# Patient Record
Sex: FEMALE | Race: WHITE | URBAN - METROPOLITAN AREA
[De-identification: names, ages, dates, MRNs, and addresses within clinical notes are randomized per-mention and may not be internally consistent; named-entity substitution may affect disease eponyms.]

---

## 2019-09-11 ENCOUNTER — OFFICE VISIT (OUTPATIENT)
Dept: INTERNAL MEDICINE CLINIC | Facility: CLINIC | Age: 39
End: 2019-09-11

## 2019-09-11 VITALS
DIASTOLIC BLOOD PRESSURE: 58 MMHG | HEART RATE: 83 BPM | RESPIRATION RATE: 16 BRPM | OXYGEN SATURATION: 95 % | SYSTOLIC BLOOD PRESSURE: 90 MMHG | HEIGHT: 63 IN | BODY MASS INDEX: 35.54 KG/M2 | WEIGHT: 200.6 LBS | TEMPERATURE: 98.2 F

## 2019-09-11 DIAGNOSIS — D64.9 LOW HEMOGLOBIN: ICD-10-CM

## 2019-09-11 DIAGNOSIS — Z76.89 ESTABLISHING CARE WITH NEW DOCTOR, ENCOUNTER FOR: ICD-10-CM

## 2019-09-11 DIAGNOSIS — Z13.220 SCREENING FOR HYPERCHOLESTEROLEMIA: ICD-10-CM

## 2019-09-11 DIAGNOSIS — R53.82 CHRONIC FATIGUE: Primary | ICD-10-CM

## 2019-09-11 DIAGNOSIS — Z13.1 SCREENING FOR DIABETES MELLITUS: ICD-10-CM

## 2019-09-11 DIAGNOSIS — F41.8 DEPRESSION WITH ANXIETY: ICD-10-CM

## 2019-09-11 DIAGNOSIS — I95.9 HYPOTENSION, UNSPECIFIED HYPOTENSION TYPE: ICD-10-CM

## 2019-09-11 DIAGNOSIS — E66.01 SEVERE OBESITY (HCC): ICD-10-CM

## 2019-09-11 DIAGNOSIS — N28.9 FUNCTION KIDNEY DECREASED: ICD-10-CM

## 2019-09-11 PROBLEM — S39.012A STRAIN OF LUMBAR REGION: Status: ACTIVE | Noted: 2017-10-23

## 2019-09-11 PROBLEM — M51.27 HERNIATED NUCLEUS PULPOSUS, L5-S1: Status: ACTIVE | Noted: 2017-10-26

## 2019-09-11 PROBLEM — M51.36 DDD (DEGENERATIVE DISC DISEASE), LUMBAR: Status: ACTIVE | Noted: 2018-07-31

## 2019-09-11 PROBLEM — M54.31 RIGHT SIDED SCIATICA: Status: ACTIVE | Noted: 2017-11-21

## 2019-09-11 PROBLEM — M54.50 SEVERE LOW BACK PAIN: Status: ACTIVE | Noted: 2018-07-31

## 2019-09-11 RX ORDER — CARISOPRODOL 350 MG/1
350 TABLET ORAL
COMMUNITY
Start: 2017-10-17 | End: 2019-09-11

## 2019-09-11 RX ORDER — ESCITALOPRAM OXALATE 10 MG/1
10 TABLET ORAL
COMMUNITY
Start: 2017-09-25 | End: 2019-09-11 | Stop reason: SDUPTHER

## 2019-09-11 RX ORDER — LISINOPRIL 20 MG/1
20 TABLET ORAL DAILY
Qty: 30 TAB | Refills: 11 | Status: CANCELLED | OUTPATIENT
Start: 2019-09-11

## 2019-09-11 RX ORDER — GLUCOSAMINE/CHONDR SU A SOD 750-600 MG
TABLET ORAL
COMMUNITY
End: 2021-08-17

## 2019-09-11 RX ORDER — DICLOFENAC SODIUM 75 MG/1
TABLET, DELAYED RELEASE ORAL
COMMUNITY
Start: 2018-07-30 | End: 2019-09-11

## 2019-09-11 RX ORDER — ATORVASTATIN CALCIUM 20 MG/1
20 TABLET, FILM COATED ORAL
COMMUNITY
Start: 2017-09-25 | End: 2019-09-11

## 2019-09-11 RX ORDER — ALPRAZOLAM 0.5 MG/1
TABLET ORAL
COMMUNITY
Start: 2017-09-12 | End: 2019-09-11

## 2019-09-11 RX ORDER — METHYLPREDNISOLONE 4 MG/1
TABLET ORAL
COMMUNITY
Start: 2018-07-31 | End: 2019-09-11

## 2019-09-11 RX ORDER — LISINOPRIL 20 MG/1
20 TABLET ORAL
COMMUNITY
Start: 2017-09-25 | End: 2019-09-11

## 2019-09-11 RX ORDER — TRAMADOL HYDROCHLORIDE 50 MG/1
50 TABLET ORAL
COMMUNITY
Start: 2017-10-17 | End: 2019-09-11

## 2019-09-11 RX ORDER — ATORVASTATIN CALCIUM 20 MG/1
20 TABLET, FILM COATED ORAL DAILY
Qty: 30 TAB | Refills: 11 | Status: CANCELLED | OUTPATIENT
Start: 2019-09-11

## 2019-09-11 RX ORDER — ESCITALOPRAM OXALATE 10 MG/1
10 TABLET ORAL DAILY
Qty: 30 TAB | Refills: 11 | Status: SHIPPED | OUTPATIENT
Start: 2019-09-11 | End: 2020-08-08

## 2019-09-11 NOTE — PROGRESS NOTES
HPI  Logan De La Vega is a 42y.o. year old female patient of No primary care provider on file. who presents with c/o est care. Pt has history of has DDD (degenerative disc disease), lumbar, Herniated nucleus pulposus, L5-S1, Right sided sciatica, Severe low back pain, Strain of lumbar region, Severe obesity ([address] Hypercholesteremia, HTN (hypertension), and Depression on their problem list..    Pt here to est care. [facility]. Has been out of her meds for 1 yr. Here to f/u on ED visit. Was seen at [facility] on 9-6 dx with dehydration. Labs showed Cr 1.75, BUN 34, CMP otherwise NML. TSH 4.8, T4 normal.  Hgb 11.2,HCT 33, . Repeat Cr 1.5, . Started with fatigue, palpitations, clamminess, sweating since last Wednesday, stopped her lisinopril at that time due to low BP, went to ED that Friday. BS was 136 at work, works at [employer]. Denies n/v/d or cold symptoms during that time. Feels better now, but not 100% yet. Checks BP at work 3 times/day since leaving ED- 80s-90s/50s-70s. No systolic numbers in the [address]. Drinks 3 liters of water/day at work and more after. Having trouble losing weight, has tried gym, diet changes, and low carb which hasn't worked. Has noticed hair falling out more x 1 month. Feels tired all the time. Lifestyle  Diet: Tries to avoid carbs and bread. Eats fruits and vegetables, salads, chicken. Denies fried or fast foods. Drinks 1 can pepsi/day. No coffee or energy drinks. Exercise: Was working out at gym 2 hrs every night. Now goes for a walk in the evenings everyday. ETOH: Denies  Nicotine: 3-4 cigarettes/day  Works at [employer]. Denies chest pain, chest pressure. Denies SOB, orthopnea, or PND. Has lower extremity swelling at end of day. Denies HA or blurred vision. Has had some dizziness over the last week when stands up. Denies N/V/D, constipation, heartburn, or abd pain.     Denies BRBPR, melena, or blood in urine. 3 most recent PHQ Screens 2019   Little interest or pleasure in doing things Not at all   Feeling down, depressed, irritable, or hopeless More than half the days   Total Score PHQ 2 2   Denies SI/HI. Feels jittery, on edge, irritable, worries often. Also feels down, depressed at times. Wants to restart her lexapro, felt better on it. Always had trouble sleeping- takes melatonin occasionally which helps. Patient Active Problem List   Diagnosis Code    DDD (degenerative disc disease), lumbar M51.36    Herniated nucleus pulposus, L5-S1 M51.27    Right sided sciatica M54.31    Severe low back pain M54.5    Strain of lumbar region S39.012A    Severe obesity ([facility] [address] E66.01    Hypercholesteremia E78.00    HTN (hypertension) I10    Depression F32.9     Past Medical History:   Diagnosis Date    Depression     HTN (hypertension)     Hypercholesteremia     Rosacea      Past Surgical History:   Procedure Laterality Date    HX  SECTION      HX GYN      tubal ligation     Social History     Socioeconomic History    Marital status:      Spouse name: Not on file    Number of children: Not on file    Years of education: Not on file    Highest education level: Not on file   Tobacco Use    Smoking status: Current Every Day Smoker     Packs/day: 0.25     Years: 10.00     Pack years: 2.50    Smokeless tobacco: Never Used    Tobacco comment: 3-5 cigarettes/day    Substance and Sexual Activity    Alcohol use: Not Currently    Drug use: Never    Sexual activity: Yes   Social History Narrative    Works at [employer].       Family History   Problem Relation Age of Onset    Hypertension Mother     Thyroid Disease Mother     Hypertension Father     Heart Disease Father     Cancer Father         bone, lung cancer, throat     Stroke Father     Heart Attack Paternal Grandfather         55     Allergies   Allergen Reactions    Cephalexin Shortness of Breath       MEDICATIONS  Current Outpatient Medications   Medication Sig    Biotin 2,500 mcg cap Take  by mouth.  escitalopram oxalate (LEXAPRO) 10 mg tablet Take 1 Tab by mouth daily. No current facility-administered medications for this visit. REVIEW OF SYSTEMS  Per HPI        Visit Vitals  BP 90/58   Pulse 83   Temp 98.2 °F (36.8 °C) (Oral)   Resp 16   Ht 5' 3\" (1.6 m)   Wt 200 lb 9.6 oz (91 kg)   LMP 08/28/2019   SpO2 95%   BMI 35.53 kg/m²         General: Well-developed, well-nourished. In no distress. A&O x 3. Head: Normocephalic, atraumatic. Eyes: Conjunctiva clear. Pupils equal, round, reactive to light. Extraocular movements intact. Ears/Nose: TM's and ear canals normal bilaterally. Nares normal. Septum midline. Normal nasal mucosa. No drainage or sinus tenderness. Mouth/Throat: Lips, mucosa, and tongue normal. Oropharynx benign. Neck: Supple, symmetrical, trachea midline, no lymphadenopathy, no carotid bruits, no JVD, thyroid: not enlarged, symmetric, no tenderness/mass/nodules. Lungs: Clear to auscultation bilaterally. No crackles or wheezes. No use of accessory muscles. Speaks in full sentences without SOB. Chest Wall: No tenderness or deformity. Heart: RRR, normal S1 and S2, no murmur, click, rub, or gallop. Back: Symmetric. Skin: No rashes or lesions. Neurological: CN II-XII intact. Neurovasc: No edema appreciated. Dorsalis pedis pulses are 2+ on the right side, and 2+ on the left side. Posterior tibial pulses are 2+ on the right side, and 2+ on the left side. Musculoskeletal: Gait normal.   Psychiatric: Normal mood and affect. Behavior is normal.       No results found for any visits on 09/11/19. ASSESSMENT and PLAN  Diagnoses and all orders for this visit:    1. Chronic fatigue  -     TSH RFX ON ABNORMAL TO FREE T4  R/t thryoid, anxiety/depression, improper sleep, anemia, etc?    2.  Hypotension, unspecified hypotension type  Unclear etiology  Continue to hold lisinopril  Continue to push fluids, will repeat labs today    3. Function kidney decreased  -     METABOLIC PANEL, BASIC-     4. Depression with anxiety  -     escitalopram oxalate (LEXAPRO) 10 mg tablet; Take 1 Tab by mouth daily. -RESTART medication    5. Severe obesity (HCC)  -     LIPID PANEL  -     AMB POC HEMOGLOBIN A1C    6. Low hemoglobin  -     CBC WITH AUTOMATED DIFF  -     IRON PROFILE    7. Establishing care with new doctor, encounter for    8. Screening for diabetes mellitus  -     AMB POC HEMOGLOBIN A1C    9. Screening for hypercholesterolemia  -     LIPID PANEL            Patient Instructions          Anxiety Disorder: Care Instructions  Your Care Instructions    Anxiety is a normal reaction to stress. Difficult situations can cause you to have symptoms such as sweaty palms and a nervous feeling. In an anxiety disorder, the symptoms are far more severe. Constant worry, muscle tension, trouble sleeping, nausea and diarrhea, and other symptoms can make normal daily activities difficult or impossible. These symptoms may occur for no reason, and they can affect your work, school, or social life. Medicines, counseling, and self-care can all help. Follow-up care is a key part of your treatment and safety. Be sure to make and go to all appointments, and call your doctor if you are having problems. It's also a good idea to know your test results and keep a list of the medicines you take. How can you care for yourself at home? · Take medicines exactly as directed. Call your doctor if you think you are having a problem with your medicine. · Go to your counseling sessions and follow-up appointments. · Recognize and accept your anxiety. Then, when you are in a situation that makes you anxious, say to yourself, \"This is not an emergency. I feel uncomfortable, but I am not in danger. I can keep going even if I feel anxious. \"  · Be kind to your body:  ? Relieve tension with exercise or a massage. ? Get enough rest.  ? Avoid alcohol, caffeine, nicotine, and illegal drugs. They can increase your anxiety level and cause sleep problems. ? Learn and do relaxation techniques. See below for more about these techniques. · Engage your mind. Get out and do something you enjoy. Go to a funny movie, or take a walk or hike. Plan your day. Having too much or too little to do can make you anxious. · Keep a record of your symptoms. Discuss your fears with a good friend or family member, or join a support group for people with similar problems. Talking to others sometimes relieves stress. · Get involved in social groups, or volunteer to help others. Being alone sometimes makes things seem worse than they are. · Get at least 30 minutes of exercise on most days of the week to relieve stress. Walking is a good choice. You also may want to do other activities, such as running, swimming, cycling, or playing tennis or team sports. Relaxation techniques  Do relaxation exercises 10 to 20 minutes a day. You can play soothing, relaxing music while you do them, if you wish. · Tell others in your house that you are going to do your relaxation exercises. Ask them not to disturb you. · Find a comfortable place, away from all distractions and noise. · Lie down on your back, or sit with your back straight. · Focus on your breathing. Make it slow and steady. · Breathe in through your nose. Breathe out through either your nose or mouth. · Breathe deeply, filling up the area between your navel and your rib cage. Breathe so that your belly goes up and down. · Do not hold your breath. · Breathe like this for 5 to 10 minutes. Notice the feeling of calmness throughout your whole body. As you continue to breathe slowly and deeply, relax by doing the following for another 5 to 10 minutes:  · Tighten and relax each muscle group in your body. You can begin at your toes and work your way up to your head.   · Imagine your muscle groups relaxing and becoming heavy. · Empty your mind of all thoughts. · Let yourself relax more and more deeply. · Become aware of the state of calmness that surrounds you. · When your relaxation time is over, you can bring yourself back to alertness by moving your fingers and toes and then your hands and feet and then stretching and moving your entire body. Sometimes people fall asleep during relaxation, but they usually wake up shortly afterward. · Always give yourself time to return to full alertness before you drive a car or do anything that might cause an accident if you are not fully alert. Never play a relaxation tape while you drive a car. When should you call for help? Call 911 anytime you think you may need emergency care. For example, call if:    · You feel you cannot stop from hurting yourself or someone else.   Darroll Zarpo the numbers for these national suicide hotlines: 9-453-941-TALK (6-283.284.9599) and 9-911-FCYJFHM (7-291.444.5643). If you or someone you know talks about suicide or feeling hopeless, get help right away.   Watch closely for changes in your health, and be sure to contact your doctor if:    · You have anxiety or fear that affects your life.     · You have symptoms of anxiety that are new or different from those you had before. Where can you learn more? Go to http://osito-bird.info/. Enter P754 in the search box to learn more about \"Anxiety Disorder: Care Instructions. \"  Current as of: September 11, 2018  Content Version: 12.1  © 5907-2392 Healthwise, Incorporated. Care instructions adapted under license by [employer] (which disclaims liability or warranty for this information). If you have questions about a medical condition or this instruction, always ask your healthcare professional. [address] any warranty or liability for your use of this information.          Fatigue: Care Instructions  Your Care Instructions    Fatigue is a feeling of tiredness, exhaustion, or lack of energy. You may feel fatigue because of too much or not enough activity. It can also come from stress, lack of sleep, boredom, and poor diet. Many medical problems, such as viral infections, can cause fatigue. Emotional problems, especially depression, are often the cause of fatigue. Fatigue is most often a symptom of another problem. Treatment for fatigue depends on the cause. For example, if you have fatigue because you have a certain health problem, treating this problem also treats your fatigue. If depression or anxiety is the cause, treatment may help. Follow-up care is a key part of your treatment and safety. Be sure to make and go to all appointments, and call your doctor if you are having problems. It's also a good idea to know your test results and keep a list of the medicines you take. How can you care for yourself at home? · Get regular exercise. But don't overdo it. Go back and forth between rest and exercise. · Get plenty of rest.  · Eat a healthy diet. Do not skip meals, especially breakfast.  · Reduce your use of caffeine, tobacco, and alcohol. Caffeine is most often found in coffee, tea, cola drinks, and chocolate. · Limit medicines that can cause fatigue. This includes tranquilizers and cold and allergy medicines. When should you call for help? Watch closely for changes in your health, and be sure to contact your doctor if:    · You have new symptoms such as fever or a rash.     · Your fatigue gets worse.     · You have been feeling down, depressed, or hopeless. Or you may have lost interest in things that you usually enjoy.     · You are not getting better as expected. Where can you learn more? Go to http://osito-bird.info/. Enter P812 in the search box to learn more about \"Fatigue: Care Instructions. \"  Current as of: September 23, 2018  Content Version: 12.1  © 0877-2231 Healthwise, Incorporated. Care instructions adapted under license by [employer] (which disclaims liability or warranty for this information). If you have questions about a medical condition or this instruction, always ask your healthcare professional. [address] any warranty or liability for your use of this information. Low Blood Pressure: Care Instructions  Your Care Instructions    Blood pressure is a measurement of the force of the blood against the walls of the blood vessels during and after each beat of the heart. Low blood pressure is also called hypotension. It means that your blood pressure is much lower than normal. Some people, especially young, slim women, may have slightly low blood pressure without symptoms. But in many people, low blood pressure can cause symptoms such as feeling dizzy or lightheaded. When your blood pressure is too low, your heart, brain, and other organs do not get enough blood. Low blood pressure can be caused by many things, including heart problems and some medicines. Diabetes that is not under control can cause your blood pressure to drop. And so can a severe allergic reaction or infection. Another cause is dehydration, which is when your body loses too much fluid. Treatment for low blood pressure depends on the cause. Follow-up care is a key part of your treatment and safety. Be sure to make and go to all appointments, and call your doctor if you are having problems. It's also a good idea to know your test results and keep a list of the medicines you take. How can you care for yourself at home? · Drink plenty of fluids, enough so that your urine is light yellow or clear like water. If you have kidney, heart, or liver disease and have to limit fluids, talk with your doctor before you increase the amount of fluids you drink. · Be safe with medicines. Call your doctor if you think you are having a problem with your medicine.  You will get more details on the specific medicines your doctor prescribes. · Stand up or get out of bed very slowly to allow your body to adjust.  · Get plenty of rest.  · Do not smoke. Smoking increases your risk of heart attack. If you need help quitting, talk to your doctor about stop-smoking programs and medicines. These can increase your chances of quitting for good. · Limit alcohol to 2 drinks a day for men and 1 drink a day for women. Alcohol may interfere with your medicine. In addition, alcohol can make your low blood pressure worse by causing your body to lose water. When should you call for help? Call 911 anytime you think you may need emergency care. For example, call if:    · You passed out (lost consciousness).    Call your doctor now or seek immediate medical care if:    · You are dizzy or lightheaded, or you feel like you may faint.    Watch closely for changes in your health, and be sure to contact your doctor if you have any problems. Where can you learn more? Go to http://osito-bird.info/. Enter C304 in the search box to learn more about \"Low Blood Pressure: Care Instructions. \"  Current as of: July 22, 2018  Content Version: 12.1  © 9512-0929 Healthwise[employer]. Care instructions adapted under license by [employer] (which disclaims liability or warranty for this information). If you have questions about a medical condition or this instruction, always ask your healthcare professional. [place] [address] any warranty or liability for your use of this information. There are no preventive care reminders to display for this patient. I have discussed the diagnosis with the patient and the intended plan as seen in the above orders. Patient is in agreement. The patient has received an after-visit summary and questions were answered concerning future plans. I have discussed medication side effects and warnings with the patient as well. Warning signs for the above conditions were discussed including when to call our office or go to the emergency room. The nurse provided the patient and/or family with advanced directive information if needed and encouraged the patient to provide a copy to the office when available.

## 2019-09-11 NOTE — PROGRESS NOTES
Ben Asher  Identified pt with two pt identifiers(name and ). Chief Complaint   Patient presents with   [address]     sent to ER from work low bp 90/55    Dehydration       Reviewed record In preparation for visit and have obtained necessary documentation. Has info on advanced directive but has not filled them out. Given info on advanced directives. 1. Have you been to the ER, urgent care clinic or hospitalized since your last visit? No     2. Have you seen or consulted any other health care providers outside of the [address] [place] [address] Avinash since your last visit? Include any pap smears or colon screening. No    Vitals reviewed with provider. Health Maintenance reviewed: There are no preventive care reminders to display for this patient. Wt Readings from Last 3 Encounters:   19 200 lb 9.6 oz (91 kg)        Temp Readings from Last 3 Encounters:   19 98.2 °F (36.8 °C) (Oral)        BP Readings from Last 3 Encounters:   19 (!) 84/57        Pulse Readings from Last 3 Encounters:   19 83        Vitals:    19 1043   BP: (!) 84/57   Pulse: 83   Resp: 16   Temp: 98.2 °F (36.8 °C)   TempSrc: Oral   SpO2: 95%   Weight: 200 lb 9.6 oz (91 kg)   Height: 5' 3\" (1.6 m)   PainSc:   0 - No pain   LMP: 2019          Learning Assessment:   :       Learning Assessment 2019   PRIMARY LEARNER Patient   BARRIERS PRIMARY LEARNER NONE   CO-LEARNER CAREGIVER No   PRIMARY LANGUAGE ENGLISH   LEARNER PREFERENCE PRIMARY LISTENING   ANSWERED BY self   RELATIONSHIP SELF        Depression Screening:   :       3 most recent PHQ Screens 2019   Little interest or pleasure in doing things Not at all   Feeling down, depressed, irritable, or hopeless More than half the days   Total Score PHQ 2 2        Fall Risk Assessment:   :     No flowsheet data found. Abuse Screening:   :     No flowsheet data found. ADL Screening:   :     No flowsheet data found.

## 2019-09-11 NOTE — PATIENT INSTRUCTIONS
Anxiety Disorder: Care Instructions  Your Care Instructions    Anxiety is a normal reaction to stress. Difficult situations can cause you to have symptoms such as sweaty palms and a nervous feeling. In an anxiety disorder, the symptoms are far more severe. Constant worry, muscle tension, trouble sleeping, nausea and diarrhea, and other symptoms can make normal daily activities difficult or impossible. These symptoms may occur for no reason, and they can affect your work, school, or social life. Medicines, counseling, and self-care can all help. Follow-up care is a key part of your treatment and safety. Be sure to make and go to all appointments, and call your doctor if you are having problems. It's also a good idea to know your test results and keep a list of the medicines you take. How can you care for yourself at home? · Take medicines exactly as directed. Call your doctor if you think you are having a problem with your medicine. · Go to your counseling sessions and follow-up appointments. · Recognize and accept your anxiety. Then, when you are in a situation that makes you anxious, say to yourself, \"This is not an emergency. I feel uncomfortable, but I am not in danger. I can keep going even if I feel anxious. \"  · Be kind to your body:  ? Relieve tension with exercise or a massage. ? Get enough rest.  ? Avoid alcohol, caffeine, nicotine, and illegal drugs. They can increase your anxiety level and cause sleep problems. ? Learn and do relaxation techniques. See below for more about these techniques. · Engage your mind. Get out and do something you enjoy. Go to a funny movie, or take a walk or hike. Plan your day. Having too much or too little to do can make you anxious. · Keep a record of your symptoms. Discuss your fears with a good friend or family member, or join a support group for people with similar problems. Talking to others sometimes relieves stress.   · Get involved in social groups, or volunteer to help others. Being alone sometimes makes things seem worse than they are. · Get at least 30 minutes of exercise on most days of the week to relieve stress. Walking is a good choice. You also may want to do other activities, such as running, swimming, cycling, or playing tennis or team sports. Relaxation techniques  Do relaxation exercises 10 to 20 minutes a day. You can play soothing, relaxing music while you do them, if you wish. · Tell others in your house that you are going to do your relaxation exercises. Ask them not to disturb you. · Find a comfortable place, away from all distractions and noise. · Lie down on your back, or sit with your back straight. · Focus on your breathing. Make it slow and steady. · Breathe in through your nose. Breathe out through either your nose or mouth. · Breathe deeply, filling up the area between your navel and your rib cage. Breathe so that your belly goes up and down. · Do not hold your breath. · Breathe like this for 5 to 10 minutes. Notice the feeling of calmness throughout your whole body. As you continue to breathe slowly and deeply, relax by doing the following for another 5 to 10 minutes:  · Tighten and relax each muscle group in your body. You can begin at your toes and work your way up to your head. · Imagine your muscle groups relaxing and becoming heavy. · Empty your mind of all thoughts. · Let yourself relax more and more deeply. · Become aware of the state of calmness that surrounds you. · When your relaxation time is over, you can bring yourself back to alertness by moving your fingers and toes and then your hands and feet and then stretching and moving your entire body. Sometimes people fall asleep during relaxation, but they usually wake up shortly afterward. · Always give yourself time to return to full alertness before you drive a car or do anything that might cause an accident if you are not fully alert.  Never play a relaxation tape while you drive a car. When should you call for help? Call 911 anytime you think you may need emergency care. For example, call if:    · You feel you cannot stop from hurting yourself or someone else.   Tuyet Trevino the numbers for these national suicide hotlines: 8-224-441-TALK (2-675.244.2958) and 7-070-DDFEUYA (0-881.328.1154). If you or someone you know talks about suicide or feeling hopeless, get help right away.   Watch closely for changes in your health, and be sure to contact your doctor if:    · You have anxiety or fear that affects your life.     · You have symptoms of anxiety that are new or different from those you had before. Where can you learn more? Go to http://osito-bird.info/. Enter P754 in the search box to learn more about \"Anxiety Disorder: Care Instructions. \"  Current as of: September 11, 2018  Content Version: 12.1  © 1942-4713 [employer]. Care instructions adapted under license by [employer] (which disclaims liability or warranty for this information). If you have questions about a medical condition or this instruction, always ask your healthcare professional. [place] [address] any warranty or liability for your use of this information. Fatigue: Care Instructions  Your Care Instructions    Fatigue is a feeling of tiredness, exhaustion, or lack of energy. You may feel fatigue because of too much or not enough activity. It can also come from stress, lack of sleep, boredom, and poor diet. Many medical problems, such as viral infections, can cause fatigue. Emotional problems, especially depression, are often the cause of fatigue. Fatigue is most often a symptom of another problem. Treatment for fatigue depends on the cause. For example, if you have fatigue because you have a certain health problem, treating this problem also treats your fatigue. If depression or anxiety is the cause, treatment may help.   Follow-up care is a key part of your treatment and safety. Be sure to make and go to all appointments, and call your doctor if you are having problems. It's also a good idea to know your test results and keep a list of the medicines you take. How can you care for yourself at home? · Get regular exercise. But don't overdo it. Go back and forth between rest and exercise. · Get plenty of rest.  · Eat a healthy diet. Do not skip meals, especially breakfast.  · Reduce your use of caffeine, tobacco, and alcohol. Caffeine is most often found in coffee, tea, cola drinks, and chocolate. · Limit medicines that can cause fatigue. This includes tranquilizers and cold and allergy medicines. When should you call for help? Watch closely for changes in your health, and be sure to contact your doctor if:    · You have new symptoms such as fever or a rash.     · Your fatigue gets worse.     · You have been feeling down, depressed, or hopeless. Or you may have lost interest in things that you usually enjoy.     · You are not getting better as expected. Where can you learn more? Go to http://ositoDomgeo.rubird.info/. Enter N712 in the search box to learn more about \"Fatigue: Care Instructions. \"  Current as of: September 23, 2018  Content Version: 12.1  © 2577-6421 [employer]. Care instructions adapted under license by [employer] (which disclaims liability or warranty for this information). If you have questions about a medical condition or this instruction, always ask your healthcare professional. [place] [address] any warranty or liability for your use of this information. Low Blood Pressure: Care Instructions  Your Care Instructions    Blood pressure is a measurement of the force of the blood against the walls of the blood vessels during and after each beat of the heart. Low blood pressure is also called hypotension.  It means that your blood pressure is much lower than normal. Some people, especially young, slim women, may have slightly low blood pressure without symptoms. But in many people, low blood pressure can cause symptoms such as feeling dizzy or lightheaded. When your blood pressure is too low, your heart, brain, and other organs do not get enough blood. Low blood pressure can be caused by many things, including heart problems and some medicines. Diabetes that is not under control can cause your blood pressure to drop. And so can a severe allergic reaction or infection. Another cause is dehydration, which is when your body loses too much fluid. Treatment for low blood pressure depends on the cause. Follow-up care is a key part of your treatment and safety. Be sure to make and go to all appointments, and call your doctor if you are having problems. It's also a good idea to know your test results and keep a list of the medicines you take. How can you care for yourself at home? · Drink plenty of fluids, enough so that your urine is light yellow or clear like water. If you have kidney, heart, or liver disease and have to limit fluids, talk with your doctor before you increase the amount of fluids you drink. · Be safe with medicines. Call your doctor if you think you are having a problem with your medicine. You will get more details on the specific medicines your doctor prescribes. · Stand up or get out of bed very slowly to allow your body to adjust.  · Get plenty of rest.  · Do not smoke. Smoking increases your risk of heart attack. If you need help quitting, talk to your doctor about stop-smoking programs and medicines. These can increase your chances of quitting for good. · Limit alcohol to 2 drinks a day for men and 1 drink a day for women. Alcohol may interfere with your medicine. In addition, alcohol can make your low blood pressure worse by causing your body to lose water. When should you call for help? Call 911 anytime you think you may need emergency care. For example, call if:    · You passed out (lost consciousness).    Call your doctor now or seek immediate medical care if:    · You are dizzy or lightheaded, or you feel like you may faint.    Watch closely for changes in your health, and be sure to contact your doctor if you have any problems. Where can you learn more? Go to http://osito-bird.info/. Enter C304 in the search box to learn more about \"Low Blood Pressure: Care Instructions. \"  Current as of: July 22, 2018  Content Version: 12.1  © 6661-3739 [employer]. Care instructions adapted under license by [employer] (which disclaims liability or warranty for this information). If you have questions about a medical condition or this instruction, always ask your healthcare professional. [address] any warranty or liability for your use of this information.

## 2019-09-12 LAB
BASOPHILS # BLD AUTO: 0 X10E3/UL (ref 0–0.2)
BASOPHILS NFR BLD AUTO: 0 %
BUN SERPL-MCNC: 21 MG/DL (ref 6–20)
BUN/CREAT SERPL: 23 (ref 9–23)
CALCIUM SERPL-MCNC: 9.1 MG/DL (ref 8.7–10.2)
CHLORIDE SERPL-SCNC: 98 MMOL/L (ref 96–106)
CHOLEST SERPL-MCNC: 193 MG/DL (ref 100–199)
CO2 SERPL-SCNC: 26 MMOL/L (ref 20–29)
CREAT SERPL-MCNC: 0.92 MG/DL (ref 0.57–1)
EOSINOPHIL # BLD AUTO: 0.3 X10E3/UL (ref 0–0.4)
EOSINOPHIL NFR BLD AUTO: 4 %
ERYTHROCYTE [DISTWIDTH] IN BLOOD BY AUTOMATED COUNT: 13.1 % (ref 12.3–15.4)
GLUCOSE SERPL-MCNC: 73 MG/DL (ref 65–99)
HCT VFR BLD AUTO: 36.6 % (ref 34–46.6)
HDLC SERPL-MCNC: 43 MG/DL
HGB BLD-MCNC: 12 G/DL (ref 11.1–15.9)
IMM GRANULOCYTES # BLD AUTO: 0 X10E3/UL (ref 0–0.1)
IMM GRANULOCYTES NFR BLD AUTO: 0 %
IRON SATN MFR SERPL: 30 % (ref 15–55)
IRON SERPL-MCNC: 70 UG/DL (ref 27–159)
LDLC SERPL CALC-MCNC: 109 MG/DL (ref 0–99)
LYMPHOCYTES # BLD AUTO: 3.1 X10E3/UL (ref 0.7–3.1)
LYMPHOCYTES NFR BLD AUTO: 36 %
MCH RBC QN AUTO: 30.8 PG (ref 26.6–33)
MCHC RBC AUTO-ENTMCNC: 32.8 G/DL (ref 31.5–35.7)
MCV RBC AUTO: 94 FL (ref 79–97)
MONOCYTES # BLD AUTO: 0.4 X10E3/UL (ref 0.1–0.9)
MONOCYTES NFR BLD AUTO: 5 %
NEUTROPHILS # BLD AUTO: 4.6 X10E3/UL (ref 1.4–7)
NEUTROPHILS NFR BLD AUTO: 55 %
PLATELET # BLD AUTO: 263 X10E3/UL (ref 150–450)
POTASSIUM SERPL-SCNC: 4.8 MMOL/L (ref 3.5–5.2)
RBC # BLD AUTO: 3.89 X10E6/UL (ref 3.77–5.28)
SODIUM SERPL-SCNC: 140 MMOL/L (ref 134–144)
TIBC SERPL-MCNC: 235 UG/DL (ref 250–450)
TRIGL SERPL-MCNC: 205 MG/DL (ref 0–149)
TSH SERPL DL<=0.005 MIU/L-ACNC: 2.02 UIU/ML (ref 0.45–4.5)
UIBC SERPL-MCNC: 165 UG/DL (ref 131–425)
VLDLC SERPL CALC-MCNC: 41 MG/DL (ref 5–40)
WBC # BLD AUTO: 8.5 X10E3/UL (ref 3.4–10.8)

## 2019-09-13 ENCOUNTER — TELEPHONE (OUTPATIENT)
Dept: INTERNAL MEDICINE CLINIC | Facility: CLINIC | Age: 39
End: 2019-09-13

## 2019-09-13 NOTE — PROGRESS NOTES
Verified two patient identifiers, name and . Patient provided with lab results and Np message. No questions at this time.   Patient requested statin sent to pharmacy

## 2019-09-13 NOTE — PROGRESS NOTES
Pls let pt know blood sugar, kidney function, thyroid, blood counts are all normal.    I am still waiting on the diabetes test to return. Please let patient know her LDL cholesterol and triglycerides are elevated. Elevated cholesterol increases risk for heart disease, heart attack and stroke. Ways to decrease cholesterol include decreasing saturated fats in the diet found in many packaged foods and fried foods, increasing exercise (whcih helps raise our good protective cholesterol) and weight loss if indicated. Eating a diet high in vegetables and fruits is also helpful.

## 2019-09-15 RX ORDER — ATORVASTATIN CALCIUM 20 MG/1
TABLET, FILM COATED ORAL
Qty: 30 TAB | Refills: 0 | OUTPATIENT
Start: 2019-09-15

## 2019-09-16 NOTE — TELEPHONE ENCOUNTER
Pt is calling and states her chol as under control on atorvastatin 20 mg. She was off the the stating for 6 mo and it increased. Diet and exercise does not control it. Pt would like it refilled or speak to Bhavin Parr directly.

## 2019-09-18 RX ORDER — ATORVASTATIN CALCIUM 20 MG/1
TABLET, FILM COATED ORAL
Qty: 30 TAB | Refills: 0 | Status: SHIPPED | OUTPATIENT
Start: 2019-09-18 | End: 2019-10-16 | Stop reason: SDUPTHER

## 2019-09-19 LAB
HBA1C MFR BLD: 5.5 % (ref 4.8–5.6)
SPECIMEN STATUS REPORT, ROLRST: NORMAL

## 2019-10-13 NOTE — PROGRESS NOTES
HPI  Nicolas Altamirano is a 42y.o. year old female patient of Rufino Henson NP who presents with c/o hospital f/u    Pt has history of has DDD (degenerative disc disease), lumbar, Herniated nucleus pulposus, L5-S1, Right sided sciatica, Severe low back pain, Strain of lumbar region, Severe obesity ([address] Hypercholesteremia, HTN (hypertension), and Depression on their problem list.. Here for TCM visit. Admitted from 10/5-10/8 at [facility]. Hospital records reviewed and summary below. Pt admitted for hypotension and electrolyte imbalance on 10/5-10/8 at [facility]. Cortisol and cosyntripin normal. She was started on miodrine 10mg TID and 4 days OP of [facility] and referred to endocrine for f/u. BMP with Na of 145, K 3.6, Chl 110, BUN 5, Cr 1.05. CBC with RBC 3.47, Hgb 10.6   Seen by Nephrology in hospital for hypokalemia with low phos, low calcium- advised to f/u in office 3-4 weeks after visit with Endocrine- per plan they will check Usom and PRA and aldosterone level at that time. States she was advised to stop her iron. She was also seen by Cardiology Dr. Anirudh Doherty on 9-24-19 for hypotension, EKG was WNL, labs and 24hr BP completed for which we do not have results/records, pt referred to endocrine and advised to f/u with Dr. Ana Rosa Meadows next week. Pt seen by me on 9-11 to est care, we restarted lexapro at that time per pt request for anxiety and depression. Pt states she was never actually out of lexapro, just needed refill, and has been on it for years without any side effects. States it controls her mood well and without it she feels very anxious and irritable. She has tried Zoloft and other antidepressants in the past that have not been helpful. She does not wish to try another medication at this time. Feels like she has more energy and less off-balance on midodrine. Has not noticed any edema. No changes in weight. Denies cp, palpitations, sob or raygoza.    Sees Endocrine on 10-29, Cards on 11-1, and Nephrologist  LavonneChristen in November. Patient Active Problem List   Diagnosis Code    DDD (degenerative disc disease), lumbar M51.36    Herniated nucleus pulposus, L5-S1 M51.27    Right sided sciatica M54.31    Severe low back pain M54.5    Strain of lumbar region S39.012A    Severe obesity ([facility] [address] E66.01    Hypercholesteremia E78.00    HTN (hypertension) I10    Depression F32.9     Past Medical History:   Diagnosis Date    Depression     HTN (hypertension)     Hypercholesteremia     Rosacea      Past Surgical History:   Procedure Laterality Date    HX  SECTION      HX GYN      tubal ligation     Social History     Socioeconomic History    Marital status:      Spouse name: Not on file    Number of children: Not on file    Years of education: Not on file    Highest education level: Not on file   Tobacco Use    Smoking status: Current Every Day Smoker     Packs/day: 0.25     Years: 10.00     Pack years: 2.50    Smokeless tobacco: Never Used    Tobacco comment: 3-5 cigarettes/day    Substance and Sexual Activity    Alcohol use: Not Currently    Drug use: Never    Sexual activity: Yes   Social History Narrative    Works at [employer]. Family History   Problem Relation Age of Onset    Hypertension Mother     Thyroid Disease Mother     Hypertension Father     Heart Disease Father     Cancer Father         bone, lung cancer, throat     Stroke Father     Heart Attack Paternal Grandfather         55     Allergies   Allergen Reactions    Cephalexin Shortness of Breath       MEDICATIONS  Current Outpatient Medications   Medication Sig    atorvastatin (LIPITOR) 20 mg tablet TAKE 1 TABLET EVERY DAY    Biotin 2,500 mcg cap Take  by mouth.  escitalopram oxalate (LEXAPRO) 10 mg tablet Take 1 Tab by mouth daily. No current facility-administered medications for this visit.         REVIEW OF SYSTEMS  Per HPI        Visit Vitals  /85 (BP 1 Location: Left arm, BP Patient Position: Sitting)   Pulse 73   Temp 98 °F (36.7 °C) (Oral)   Resp 16   Ht 5' 3\" (1.6 m)   Wt 197 lb 12.8 oz (89.7 kg)   LMP 10/08/2019   SpO2 95%   BMI 35.04 kg/m²         General: Well-developed, well-nourished. In no distress. A&O x 3. Head: Normocephalic, atraumatic. Eyes: Conjunctiva clear. Mouth/Throat: Lips, mucosa, and tongue normal.   Neck: Supple, symmetrical, trachea midline, no lymphadenopathy, no carotid bruits, no JVD, thyroid: not enlarged, symmetric, no tenderness/mass/nodules. Lungs: Clear to auscultation bilaterally. No crackles or wheezes. No use of accessory muscles. Speaks in full sentences without SOB. Chest Wall: No tenderness or deformity. Heart: RRR, normal S1 and S2, no murmur, click, rub, or gallop. Skin: No rashes or lesions. Neurovasc: No edema appreciated. Dorsalis pedis pulses are 2+ on the right side, and 2+ on the left side. Posterior tibial pulses are 2+ on the right side, and 2+ on the left side. Musculoskeletal: Gait normal.   Psychiatric: Normal mood and affect. Behavior is normal.       No results found for any visits on 10/15/19. ASSESSMENT and PLAN  Diagnoses and all orders for this visit:    1. Idiopathic hypotension  -     OSMOLALITY, UR- repeat test per Nephrology   Unclear etiology of hypotension, pt BP stable today and she is feeling much better on midodrine  Keep f/u with Endocrine, Nephrology and Cards    2. Hypokalemia  -     METABOLIC PANEL, BASIC    3. Low hemoglobin  -     CBC WITH AUTOMATED DIFF    4. Depression with anxiety  Continue lexapro as I do not suspect as cause for hypotension as pt has been on this medication for years  Per UTD, <1% ADR of hypotension and greater risk with other anti-depressants (zoloft, celexa, prozac)          Patient Instructions     Continue Lexapro  Continue Midodrine. Keep f/u with Nephrology, Endocrine and Cardiology.       Low Blood Pressure: Care Instructions  Your Care Instructions    Blood pressure is a measurement of the force of the blood against the walls of the blood vessels during and after each beat of the heart. Low blood pressure is also called hypotension. It means that your blood pressure is much lower than normal. Some people, especially young, slim women, may have slightly low blood pressure without symptoms. But in many people, low blood pressure can cause symptoms such as feeling dizzy or lightheaded. When your blood pressure is too low, your heart, brain, and other organs do not get enough blood. Low blood pressure can be caused by many things, including heart problems and some medicines. Diabetes that is not under control can cause your blood pressure to drop. And so can a severe allergic reaction or infection. Another cause is dehydration, which is when your body loses too much fluid. Treatment for low blood pressure depends on the cause. Follow-up care is a key part of your treatment and safety. Be sure to make and go to all appointments, and call your doctor if you are having problems. It's also a good idea to know your test results and keep a list of the medicines you take. How can you care for yourself at home? · Drink plenty of fluids, enough so that your urine is light yellow or clear like water. If you have kidney, heart, or liver disease and have to limit fluids, talk with your doctor before you increase the amount of fluids you drink. · Be safe with medicines. Call your doctor if you think you are having a problem with your medicine. You will get more details on the specific medicines your doctor prescribes. · Stand up or get out of bed very slowly to allow your body to adjust.  · Get plenty of rest.  · Do not smoke. Smoking increases your risk of heart attack. If you need help quitting, talk to your doctor about stop-smoking programs and medicines. These can increase your chances of quitting for good.   · Limit alcohol to 2 drinks a day for men and 1 drink a day for women. Alcohol may interfere with your medicine. In addition, alcohol can make your low blood pressure worse by causing your body to lose water. When should you call for help? Call 911 anytime you think you may need emergency care. For example, call if:    · You passed out (lost consciousness).    Call your doctor now or seek immediate medical care if:    · You are dizzy or lightheaded, or you feel like you may faint.    Watch closely for changes in your health, and be sure to contact your doctor if you have any problems. Where can you learn more? Go to http://ositoTaylor Billing Solutionsbird.info/. Enter C304 in the search box to learn more about \"Low Blood Pressure: Care Instructions. \"  Current as of: April 9, 2019  Content Version: 12.2  © 1852-6837 [employer]. Care instructions adapted under license by [employer] (which disclaims liability or warranty for this information). If you have questions about a medical condition or this instruction, always ask your healthcare professional. [address] any warranty or liability for your use of this information. Please keep your follow-up appointment with Ned Zavaleta NP. There are no preventive care reminders to display for this patient. I have discussed the diagnosis with the patient and the intended plan as seen in the above orders. Patient is in agreement. The patient has received an after-visit summary and questions were answered concerning future plans. I have discussed medication side effects and warnings with the patient as well. Warning signs for the above conditions were discussed including when to call our office or go to the emergency room. The nurse provided the patient and/or family with advanced directive information if needed and encouraged the patient to provide a copy to the office when available.

## 2019-10-15 ENCOUNTER — OFFICE VISIT (OUTPATIENT)
Dept: INTERNAL MEDICINE CLINIC | Facility: CLINIC | Age: 39
End: 2019-10-15

## 2019-10-15 VITALS
SYSTOLIC BLOOD PRESSURE: 121 MMHG | DIASTOLIC BLOOD PRESSURE: 85 MMHG | TEMPERATURE: 98 F | RESPIRATION RATE: 16 BRPM | BODY MASS INDEX: 35.05 KG/M2 | HEIGHT: 63 IN | OXYGEN SATURATION: 95 % | WEIGHT: 197.8 LBS | HEART RATE: 73 BPM

## 2019-10-15 DIAGNOSIS — E87.6 HYPOKALEMIA: ICD-10-CM

## 2019-10-15 DIAGNOSIS — I95.0 IDIOPATHIC HYPOTENSION: Primary | ICD-10-CM

## 2019-10-15 DIAGNOSIS — D64.9 LOW HEMOGLOBIN: ICD-10-CM

## 2019-10-15 DIAGNOSIS — F41.8 DEPRESSION WITH ANXIETY: ICD-10-CM

## 2019-10-15 RX ORDER — MIDODRINE HYDROCHLORIDE 10 MG/1
10 TABLET ORAL
COMMUNITY
Start: 2019-10-08 | End: 2021-08-17

## 2019-10-15 NOTE — PATIENT INSTRUCTIONS
Continue Lexapro  Continue Midodrine. Keep f/u with Nephrology, Endocrine and Cardiology. Low Blood Pressure: Care Instructions  Your Care Instructions    Blood pressure is a measurement of the force of the blood against the walls of the blood vessels during and after each beat of the heart. Low blood pressure is also called hypotension. It means that your blood pressure is much lower than normal. Some people, especially young, slim women, may have slightly low blood pressure without symptoms. But in many people, low blood pressure can cause symptoms such as feeling dizzy or lightheaded. When your blood pressure is too low, your heart, brain, and other organs do not get enough blood. Low blood pressure can be caused by many things, including heart problems and some medicines. Diabetes that is not under control can cause your blood pressure to drop. And so can a severe allergic reaction or infection. Another cause is dehydration, which is when your body loses too much fluid. Treatment for low blood pressure depends on the cause. Follow-up care is a key part of your treatment and safety. Be sure to make and go to all appointments, and call your doctor if you are having problems. It's also a good idea to know your test results and keep a list of the medicines you take. How can you care for yourself at home? · Drink plenty of fluids, enough so that your urine is light yellow or clear like water. If you have kidney, heart, or liver disease and have to limit fluids, talk with your doctor before you increase the amount of fluids you drink. · Be safe with medicines. Call your doctor if you think you are having a problem with your medicine. You will get more details on the specific medicines your doctor prescribes. · Stand up or get out of bed very slowly to allow your body to adjust.  · Get plenty of rest.  · Do not smoke. Smoking increases your risk of heart attack.  If you need help quitting, talk to your doctor about stop-smoking programs and medicines. These can increase your chances of quitting for good. · Limit alcohol to 2 drinks a day for men and 1 drink a day for women. Alcohol may interfere with your medicine. In addition, alcohol can make your low blood pressure worse by causing your body to lose water. When should you call for help? Call 911 anytime you think you may need emergency care. For example, call if:    · You passed out (lost consciousness).    Call your doctor now or seek immediate medical care if:    · You are dizzy or lightheaded, or you feel like you may faint.    Watch closely for changes in your health, and be sure to contact your doctor if you have any problems. Where can you learn more? Go to http://osito-bird.info/. Enter C304 in the search box to learn more about \"Low Blood Pressure: Care Instructions. \"  Current as of: April 9, 2019  Content Version: 12.2  © 8540-2070 [employer]. Care instructions adapted under license by [employer] (which disclaims liability or warranty for this information). If you have questions about a medical condition or this instruction, always ask your healthcare professional. [place] [address] any warranty or liability for your use of this information.

## 2019-10-17 LAB
BASOPHILS # BLD AUTO: 0.1 X10E3/UL (ref 0–0.2)
BASOPHILS NFR BLD AUTO: 1 %
BUN SERPL-MCNC: 13 MG/DL (ref 6–20)
BUN/CREAT SERPL: 14 (ref 9–23)
CALCIUM SERPL-MCNC: 9.7 MG/DL (ref 8.7–10.2)
CHLORIDE SERPL-SCNC: 99 MMOL/L (ref 96–106)
CO2 SERPL-SCNC: 25 MMOL/L (ref 20–29)
CREAT SERPL-MCNC: 0.95 MG/DL (ref 0.57–1)
EOSINOPHIL # BLD AUTO: 0.3 X10E3/UL (ref 0–0.4)
EOSINOPHIL NFR BLD AUTO: 5 %
ERYTHROCYTE [DISTWIDTH] IN BLOOD BY AUTOMATED COUNT: 13.4 % (ref 12.3–15.4)
GLUCOSE SERPL-MCNC: 83 MG/DL (ref 65–99)
HCT VFR BLD AUTO: 36.1 % (ref 34–46.6)
HGB BLD-MCNC: 12.5 G/DL (ref 11.1–15.9)
IMM GRANULOCYTES # BLD AUTO: 0 X10E3/UL (ref 0–0.1)
IMM GRANULOCYTES NFR BLD AUTO: 0 %
LYMPHOCYTES # BLD AUTO: 2.1 X10E3/UL (ref 0.7–3.1)
LYMPHOCYTES NFR BLD AUTO: 30 %
MCH RBC QN AUTO: 32.6 PG (ref 26.6–33)
MCHC RBC AUTO-ENTMCNC: 34.6 G/DL (ref 31.5–35.7)
MCV RBC AUTO: 94 FL (ref 79–97)
MONOCYTES # BLD AUTO: 0.4 X10E3/UL (ref 0.1–0.9)
MONOCYTES NFR BLD AUTO: 6 %
NEUTROPHILS # BLD AUTO: 4.3 X10E3/UL (ref 1.4–7)
NEUTROPHILS NFR BLD AUTO: 58 %
OSMOLALITY UR: 226 MOSMOL/KG
PLATELET # BLD AUTO: 280 X10E3/UL (ref 150–450)
POTASSIUM SERPL-SCNC: 3.9 MMOL/L (ref 3.5–5.2)
RBC # BLD AUTO: 3.83 X10E6/UL (ref 3.77–5.28)
SODIUM SERPL-SCNC: 139 MMOL/L (ref 134–144)
WBC # BLD AUTO: 7.3 X10E3/UL (ref 3.4–10.8)

## 2020-08-07 DIAGNOSIS — F41.8 DEPRESSION WITH ANXIETY: ICD-10-CM

## 2020-08-08 RX ORDER — ESCITALOPRAM OXALATE 10 MG/1
TABLET ORAL
Qty: 30 TAB | Refills: 0 | Status: SHIPPED | OUTPATIENT
Start: 2020-08-08 | End: 2021-08-17

## 2020-10-07 ENCOUNTER — OFFICE VISIT (OUTPATIENT)
Dept: URGENT CARE | Age: 40
End: 2020-10-07
Payer: COMMERCIAL

## 2020-10-07 VITALS — OXYGEN SATURATION: 98 % | RESPIRATION RATE: 13 BRPM | TEMPERATURE: 98.8 F | HEART RATE: 53 BPM

## 2020-10-07 DIAGNOSIS — R05.9 COUGH: Primary | ICD-10-CM

## 2020-10-07 DIAGNOSIS — Z20.822 CLOSE EXPOSURE TO COVID-19 VIRUS: ICD-10-CM

## 2020-10-07 DIAGNOSIS — R68.83 CHILLS: ICD-10-CM

## 2020-10-07 PROCEDURE — 99203 OFFICE O/P NEW LOW 30 MIN: CPT | Performed by: FAMILY MEDICINE

## 2020-10-07 NOTE — PROGRESS NOTES
This patient was seen in [facility] at [address] [place] [address] [facility]gent [facility] while in their vehicle due to COVID-19 pandemic with PPE and focused examination in order to decrease community viral transmission. The patient/guardian gave verbal consent to treat. Jarrod Sharma is a 47 y.o. female who presents fatigue, body aches, chills, slight cough, runny nose and HA x 4 days. Was exposed to COVID-19 by friend 1 week ago. Denies fever, SOB. PMH: HLD. The history is provided by the patient.         Past Medical History:   Diagnosis Date    Depression     HTN (hypertension)     Hypercholesteremia     Rosacea         Past Surgical History:   Procedure Laterality Date    HX  SECTION      HX GYN      tubal ligation         Family History   Problem Relation Age of Onset    Hypertension Mother     Thyroid Disease Mother     Hypertension Father     Heart Disease Father     Cancer Father         bone, lung cancer, throat     Stroke Father     Heart Attack Paternal Grandfather         55        Social History     Socioeconomic History    Marital status:      Spouse name: Not on file    Number of children: Not on file    Years of education: Not on file    Highest education level: Not on file   Occupational History    Not on file   Social Needs    Financial resource strain: Not on file    Food insecurity     Worry: Not on file     Inability: Not on file    Transportation needs     Medical: Not on file     Non-medical: Not on file   Tobacco Use    Smoking status: Current Every Day Smoker     Packs/day: 0.25     Years: 10.00     Pack years: 2.50    Smokeless tobacco: Never Used    Tobacco comment: 3-5 cigarettes/day    Substance and Sexual Activity    Alcohol use: Not Currently    Drug use: Never    Sexual activity: Yes   Lifestyle    Physical activity     Days per week: Not on file     Minutes per session: Not on file    Stress: Not on file   Relationships    Social connections Talks on phone: Not on file     Gets together: Not on file     Attends Druze service: Not on file     Active member of club or organization: Not on file     Attends meetings of clubs or organizations: Not on file     Relationship status: Not on file    Intimate partner violence     Fear of current or ex partner: Not on file     Emotionally abused: Not on file     Physically abused: Not on file     Forced sexual activity: Not on file   Other Topics Concern    Not on file   Social History Narrative    Works at [employer]. ALLERGIES: Cephalexin    Review of Systems   Constitutional: Positive for appetite change and chills. Negative for activity change and fever. HENT: Positive for rhinorrhea. Negative for sore throat. Respiratory: Positive for cough. Negative for shortness of breath and wheezing. Cardiovascular: Negative for chest pain. Gastrointestinal: Negative for abdominal pain, diarrhea, nausea and vomiting. Musculoskeletal: Positive for myalgias. Neurological: Positive for headaches. Vitals:    10/07/20 1432   Pulse: (!) 53   Resp: 13   Temp: 98.8 °F (37.1 °C)   SpO2: 98%       Physical Exam  Vitals signs and nursing note reviewed. Constitutional:       General: She is not in acute distress. Appearance: She is well-developed. She is not diaphoretic. Pulmonary:      Effort: Pulmonary effort is normal. No respiratory distress. Breath sounds: Normal breath sounds. No stridor. No wheezing, rhonchi or rales. Neurological:      Mental Status: She is alert. Psychiatric:         Behavior: Behavior normal.         Thought Content: Thought content normal.         Judgment: Judgment normal.         MDM    ICD-10-CM ICD-9-CM   1. Cough  R05 786.2   2. Chills  R68.83 780.64   3.  Close exposure to COVID-19 virus  Z20.828 V01.79       Orders Placed This Encounter    NOVEL CORONAVIRUS (COVID-19)     Scheduling Instructions:      1) Due to current limited availability of the COVID-19 PCR test, tests will be prioritized and may not be completed.              2) Order only if the test result will change clinical management or necessary for a return to mission-critical employment decision.              3) Print and instruct patient to adhere to CDC home isolation program. (Link Above)              4) Set up or refer patient for a monitoring program.              5) Have patient sign up for and leverage MyChart (if not previously done). Order Specific Question:   Is this test for diagnosis or screening? Answer:   Diagnosis of ill patient     Order Specific Question:   Symptomatic for COVID-19 as defined by CDC? Answer:   Yes     Order Specific Question:   Date of Symptom Onset     Answer:   10/4/2020     Order Specific Question:   Hospitalized for COVID-19? Answer:   No     Order Specific Question:   Admitted to ICU for COVID-19? Answer:   No     Order Specific Question:   Employed in healthcare setting? Answer:   Yes     Order Specific Question:   Resident in a congregate (group) care setting? Answer:   No     Order Specific Question:   Pregnant? Answer:   No     Order Specific Question:   Previously tested for COVID-19? Answer:   No        Self Quarantine  Tylenol prn  Increase fluids with electrolytes    If signs and symptoms become worse the pt is to go to the ER.          Procedures

## 2020-10-07 NOTE — LETTER
NOTIFICATION RETURN TO WORK / SCHOOL 
 
10/7/2020 2:41 PM 
 
Ms. Devon Pfeiffer [address] [address] To Whom It May Concern: 
 
Devon Pfeiffer was seen today at [address]. She is to quarantine for the next 7 days. Sincerely, Carolina Sorto MD

## 2020-10-09 LAB — SARS-COV-2, NAA: NOT DETECTED

## 2021-08-12 ENCOUNTER — OFFICE VISIT (OUTPATIENT)
Dept: CARDIOLOGY CLINIC | Age: 41
End: 2021-08-12
Payer: COMMERCIAL

## 2021-08-12 VITALS
HEIGHT: 63 IN | SYSTOLIC BLOOD PRESSURE: 152 MMHG | BODY MASS INDEX: 34.2 KG/M2 | WEIGHT: 193 LBS | DIASTOLIC BLOOD PRESSURE: 108 MMHG

## 2021-08-12 DIAGNOSIS — M51.36 DDD (DEGENERATIVE DISC DISEASE), LUMBAR: ICD-10-CM

## 2021-08-12 DIAGNOSIS — R42 DIZZINESS: Primary | ICD-10-CM

## 2021-08-12 DIAGNOSIS — I10 HYPERTENSION, UNSPECIFIED TYPE: ICD-10-CM

## 2021-08-12 PROCEDURE — 99204 OFFICE O/P NEW MOD 45 MIN: CPT | Performed by: INTERNAL MEDICINE

## 2021-08-12 PROCEDURE — 93000 ELECTROCARDIOGRAM COMPLETE: CPT | Performed by: INTERNAL MEDICINE

## 2021-08-12 NOTE — PROGRESS NOTES
Cardiac Electrophysiology OFFICE Consultation Note     Subjective:      Melissa Regan is a 46 y.o. patient who is seen for evaluation for POTS  She said that her symptoms of dizziness was felt to be orthostatic hypotension and she showed me the vital signs back in December when Dr. Kareen Munoz took care of her at [facility].   She had tendency to drop her blood pressures to the low 90 mmHg  She did not have tachycardia with the vital signs that she took at home. Lately the patient ran out of Florinef and midodrine and her blood pressure is very high sometimes 157 mmHg. She does not have sinus tachycardia. EKG today showed normal sinus rhythm. She is under a lot of stress at work and at home and has been to [facility] a couple of weeks ago and was told that she need to be having a tilt table test for POTS. 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 She said that she has anxiety and depression and would like her PCP to change her Lexapro to something else because it is not effective. She is concerned about the fluctuation of her blood pressure at this time. Patient Active Problem List   Diagnosis Code    DDD (degenerative disc disease), lumbar M51.36    Herniated nucleus pulposus, L5-S1 M51.27    Right sided sciatica M54.31    Severe low back pain M54.5    Strain of lumbar region S39.012A    Severe obesity ([address] E66.01    Hypercholesteremia E78.00    HTN (hypertension) I10    Depression F32.9     Current Outpatient Medications   Medication Sig Dispense Refill    escitalopram oxalate (LEXAPRO) 10 mg tablet TAKE 1 TABLET EVERY DAY (Patient not taking: Reported on 8/12/2021) 30 Tab 0    atorvastatin (LIPITOR) 20 mg tablet TAKE 1 TABLET EVERY DAY (Patient not taking: Reported on 8/12/2021) 90 Tab 1    midodrine (PROAMITINE) 10 mg tablet 10 mg.  (Patient not taking: Reported on 8/12/2021)      Biotin 2,500 mcg cap Take  by mouth. (Patient not taking: Reported on 2021)       Allergies   Allergen Reactions    Cephalexin Shortness of Breath     Past Medical History:   Diagnosis Date    Depression     HTN (hypertension)     Hypercholesteremia     Rosacea      Past Surgical History:   Procedure Laterality Date    HX  SECTION      HX GYN      tubal ligation     Family History   Problem Relation Age of Onset    Hypertension Mother     Thyroid Disease Mother     Hypertension Father     Heart Disease Father     Cancer Father         bone, lung cancer, throat     Stroke Father     Heart Attack Paternal Grandfather         55     Social History     Tobacco Use    Smoking status: Current Every Day Smoker     Packs/day: 0.25     Years: 10.00     Pack years: 2.50    Smokeless tobacco: Never Used    Tobacco comment: 3-5 cigarettes/day    Substance Use Topics    Alcohol use: Not Currently        Review of Systems:   Constitutional: Negative for fever, chills, weight loss, + malaise/fatigue. HEENT: Negative for nosebleeds, vision changes. Respiratory: Negative for cough, hemoptysis  Cardiovascular: Negative for chest pain, palpitations, orthopnea, claudication, leg swelling, syncope, and PND. Gastrointestinal: Negative for nausea, vomiting, diarrhea, blood in stool and melena. Genitourinary: Negative for dysuria, and hematuria. Musculoskeletal: Negative for myalgias, arthralgia. Skin: Negative for rash. Heme: Does not bleed or bruise easily. Neurological: Negative for speech change and focal weakness     Objective:     Visit Vitals  BP (!) 152/108   Ht 5' 3\" (1.6 m)   Wt 193 lb (87.5 kg)   BMI 34.19 kg/m²      Physical Exam:   Constitutional: well-developed and well-nourished. No respiratory distress. Head: Normocephalic and atraumatic. Eyes: Pupils are equal, round  ENT: hearing normal  Neck: supple. No JVD present. Cardiovascular: Normal rate, regular rhythm.  Exam reveals no gallop and no friction rub. No murmur heard. Pulmonary/Chest: Effort normal and breath sounds normal. No wheezes. Abdominal: Soft, mild obesity  Musculoskeletal: no edema. Neurological: alert,oriented. Skin: Skin is warm and dry  Psychiatric: normal mood and affect. Behavior is normal. Judgment and thought content normal.      Assessment/Plan:       ICD-10-CM ICD-9-CM    1. Dizziness  R42 780.4 AMB POC EKG ROUTINE W/ 12 LEADS, INTER & REP      ECHO ADULT COMPLETE      CARDIAC HOLTER MONITOR   2. Hypertension, unspecified type  I10 401.9 ECHO ADULT COMPLETE      CARDIAC HOLTER MONITOR   3. DDD (degenerative disc disease), lumbar  M51.36 722.52       ECG NSR normal intervals    I agree with her that she is not likely having POTS since sinus tachycardia with upright posture is not present and so I do not yet recommend her doing a tilt table test.     I need to rule out structural heart disease and she never had a 2D echocardiogram done. Her blood pressure is still high we need to do some blood test for secondary hypertension.    Check TSH, T3 and T4, plasma metanephrine, aldosterone/renin level    She is not taking florinef and midodrine     norvasc 5 mg every day for now for high bp    I recommend a 24 hours Holter for her heart rate range        Follow up in 2 months    Addendum  Normetanephrine level is mildly elevated while metanephrine is normal as well as thyroid levels  This may be why she has sinus tach  Await for holter, echo and will follow up with her after    Echo normal    Future Appointments  9/1/2021   8:00 AM    VILLA PUTNAM              BS AMB  10/19/2021 8:00 AM    MD EVER Trujillo              BS AMB      Future Appointments   Date Time Provider Allegra Banuelos   8/17/2021  8:00 AM SHANNAN Bullock BS AMB   9/1/2021  8:00 AM VILLA PUTNAM AMB   10/19/2021  8:00 AM MD EVER Trujillo BS AMB         Thank you for involving me in this patient's care and please call with further concerns or questions. Vinita Toledo M.D.   Electrophysiology/Cardiology  [facility]  [address] [place] [address]                             [phone]

## 2021-08-16 ENCOUNTER — TELEPHONE (OUTPATIENT)
Dept: CARDIOLOGY CLINIC | Age: 41
End: 2021-08-16

## 2021-08-16 DIAGNOSIS — I10 HYPERTENSION, UNSPECIFIED TYPE: Primary | ICD-10-CM

## 2021-08-16 RX ORDER — AMLODIPINE BESYLATE 5 MG/1
5 TABLET ORAL DAILY
Qty: 30 TABLET | Refills: 5 | Status: SHIPPED | OUTPATIENT
Start: 2021-08-16 | End: 2021-09-08 | Stop reason: ALTCHOICE

## 2021-08-16 NOTE — PROGRESS NOTES
HPI  Jayla Strong is a 46y.o. year old female patient of Jose R Cabrera NP who presents with c/o   Chief Complaint   Patient presents with    Physical     Room 1A //        Pt has history of has DDD (degenerative disc disease), lumbar, Herniated nucleus pulposus, L5-S1, Right sided sciatica, Severe low back pain, Strain of lumbar region, Severe obesity ([address] Hypercholesteremia, HTN (hypertension), and Depression on their problem list..    Pt here for physical and to discuss depression, last seen two years ago. Lexapro made her feel numb,  didn't like her taking it, stopped it approx 1 year ago. Anxiety has been worse lately- tense, on edge, easily stressed. BP has been 754O systolic. Feels the anxiety right when she wakes up. Sleep is so-so, was taking benadryl to help her but now can sleep ok, gets about 6 hours. Drinks 1 can pepsi daily. Has been walking at night for 45 minutes. Denies fatigue. Feels \"keyed up\" all the time. Denies SI/HI. Has tried zoloft in the past but didn't help. Does not want to do counseling or therapy. Follows with Dr. Leah Garces for hypertension, dizziness, last seen 8-12-21, ECHO (9-1), labs and holter monitor ordered, EKG noted to be WNL. Started on amlodipine Reports she is frustrated with her BP- was low and thought she had POTS and now running high. Workup is pending. She denies SOB, orthopnea, or PND. Denies lower extremity swelling. Denies N/V/D or abd pain. Denies BRBPR, melena, or blood in urine.          Health Maintenance Overdue  Health Maintenance Due   Topic Date Due    Hepatitis C Screening  Never done    Pneumococcal 0-64 years (1 of 2 - PPSV23) Never done    COVID-19 Vaccine (1) Never done    DTaP/Tdap/Td series (1 - Tdap) 08/10/2012       Depression Screen  3 most recent PHQ Screens 8/17/2021   Little interest or pleasure in doing things Nearly every day   Feeling down, depressed, irritable, or hopeless Nearly every day   Total Score PHQ 2 6           Patient Active Problem List   Diagnosis Code    DDD (degenerative disc disease), lumbar M51.36    Herniated nucleus pulposus, L5-S1 M51.27    Right sided sciatica M54.31    Severe low back pain M54.5    Strain of lumbar region S39.012A    Severe obesity ([facility] [address] E66.01    Hypercholesteremia E78.00    HTN (hypertension) I10    Depression F32.9     Past Medical History:   Diagnosis Date    Depression     HTN (hypertension)     Hypercholesteremia     Rosacea      Past Surgical History:   Procedure Laterality Date    HX  SECTION      HX GYN      tubal ligation     Social History     Socioeconomic History    Marital status:      Spouse name: Not on file    Number of children: Not on file    Years of education: Not on file    Highest education level: Not on file   Tobacco Use    Smoking status: Current Every Day Smoker     Packs/day: 0.25     Years: 10.00     Pack years: 2.50    Smokeless tobacco: Never Used    Tobacco comment: 3-5 cigarettes/day    Substance and Sexual Activity    Alcohol use: Not Currently    Drug use: Never    Sexual activity: Yes   Social History Narrative    Works at [employer]. Social Determinants of Health     Financial Resource Strain:     Difficulty of Paying Living Expenses:    Food Insecurity:     Worried About Running Out of Food in the Last Year:     [address] in the Last Year:    Transportation Needs:     Lack of Transportation (Medical):      Lack of Transportation (Non-Medical):    Physical Activity:     Days of Exercise per Week:     Minutes of Exercise per Session:    Stress:     Feeling of Stress :    Social Connections:     Frequency of Communication with Friends and Family:     Frequency of Social Gatherings with Friends and Family:     Attends [employer] Services:     Active Member of Clubs or Organizations:     Attends Club or Organization Meetings:     Marital Status:      Family History   Problem Relation Age of Onset    Hypertension Mother     Thyroid Disease Mother     Hypertension Father     Heart Disease Father     Cancer Father         bone, lung cancer, throat     Stroke Father     Heart Attack Paternal Grandfather         55     Allergies   Allergen Reactions    Cephalexin Shortness of Breath       MEDICATIONS  Current Outpatient Medications   Medication Sig    amLODIPine (NORVASC) 5 mg tablet Take 1 Tablet by mouth daily.  escitalopram oxalate (LEXAPRO) 10 mg tablet TAKE 1 TABLET EVERY DAY (Patient not taking: Reported on 8/12/2021)    atorvastatin (LIPITOR) 20 mg tablet TAKE 1 TABLET EVERY DAY (Patient not taking: Reported on 8/12/2021)    midodrine (PROAMITINE) 10 mg tablet 10 mg. (Patient not taking: Reported on 8/12/2021)    Biotin 2,500 mcg cap Take  by mouth. (Patient not taking: Reported on 8/12/2021)     No current facility-administered medications for this visit. REVIEW OF SYSTEMS  Per HPI        Visit Vitals  /84 (BP 1 Location: Left upper arm, BP Patient Position: Sitting, BP Cuff Size: Adult)   Pulse 95   Temp 98.3 °F (36.8 °C) (Oral)   Resp 16   Ht 5' 3\" (1.6 m)   Wt 190 lb 6.4 oz (86.4 kg)   LMP 08/15/2021   SpO2 98%   BMI 33.73 kg/m²         General: Well-developed, well-nourished. In no distress. A&O x 3. Head: Normocephalic, atraumatic. Eyes: Conjunctiva clear. Mouth/Throat: Lips, mucosa, and tongue normal. Oropharynx benign. Neck: Supple, symmetrical, trachea midline, no lymphadenopathy, no carotid bruits, no JVD, thyroid: not enlarged, symmetric, no tenderness/mass/nodules. Lungs: Clear to auscultation bilaterally. No crackles or wheezes. No use of accessory muscles. Speaks in full sentences without SOB. Chest Wall: No tenderness or deformity. Heart: RRR, normal S1 and S2, no murmur, click, rub, or gallop. Skin: No rashes or lesions. Neurological: CN II-XII intact. Neurovasc: No edema appreciated.        Dorsalis pedis pulses are 2+ on the right side, and 2+ on the left side. Posterior tibial pulses are 2+ on the right side, and 2+ on the left side. Musculoskeletal: Gait normal.  Psychiatric: Normal mood and affect. Behavior is normal.       No results found for any visits on 08/17/21. ASSESSMENT and PLAN  Diagnoses and all orders for this visit:    1. Routine physical examination  -     METABOLIC PANEL, COMPREHENSIVE; Future  -     LIPID PANEL; Future  -     CBC WITH AUTOMATED DIFF; Future    2. Anxiety  -     busPIRone (BUSPAR) 5 mg tablet; Take 1 Tablet by mouth two (2) times a day. START NEW MED  She is reluctant to start medicine however discussed impact anxiety seems to be having on her daily life. Will send Rx to pharmacy and she will decide if wants to start it. Would like her to fu in 4-6 weeks to eval improvement  She declines referral to counseling/therapy at this time. Encouraged her to continue regular exercise for stress relief. 3. Hypertension, unspecified type  Following with Cards Dr. Fei Day, has pending labs and cardiac testing including thyroid labs  Agree with ruling out secondary causes of HTN. Patient Instructions          Anxiety Disorder: Care Instructions  Your Care Instructions     Anxiety is a normal reaction to stress. Difficult situations can cause you to have symptoms such as sweaty palms and a nervous feeling. In an anxiety disorder, the symptoms are far more severe. Constant worry, muscle tension, trouble sleeping, nausea and diarrhea, and other symptoms can make normal daily activities difficult or impossible. These symptoms may occur for no reason, and they can affect your work, school, or social life. Medicines, counseling, and self-care can all help. Follow-up care is a key part of your treatment and safety. Be sure to make and go to all appointments, and call your doctor if you are having problems.  It's also a good idea to know your test results and keep a list of the medicines you take.  How can you care for yourself at home? · Take medicines exactly as directed. Call your doctor if you think you are having a problem with your medicine. · Go to your counseling sessions and follow-up appointments. · Recognize and accept your anxiety. Then, when you are in a situation that makes you anxious, say to yourself, \"This is not an emergency. I feel uncomfortable, but I am not in danger. I can keep going even if I feel anxious. \"  · Be kind to your body:  ? Relieve tension with exercise or a massage. ? Get enough rest.  ? Avoid alcohol, caffeine, nicotine, and illegal drugs. They can increase your anxiety level and cause sleep problems. ? Learn and do relaxation techniques. See below for more about these techniques. · Engage your mind. Get out and do something you enjoy. Go to a funny movie, or take a walk or hike. Plan your day. Having too much or too little to do can make you anxious. · Keep a record of your symptoms. Discuss your fears with a good friend or family member, or join a support group for people with similar problems. Talking to others sometimes relieves stress. · Get involved in social groups, or volunteer to help others. Being alone sometimes makes things seem worse than they are. · Get at least 30 minutes of exercise on most days of the week to relieve stress. Walking is a good choice. You also may want to do other activities, such as running, swimming, cycling, or playing tennis or team sports. Relaxation techniques  Do relaxation exercises 10 to 20 minutes a day. You can play soothing, relaxing music while you do them, if you wish. · Tell others in your house that you are going to do your relaxation exercises. Ask them not to disturb you. · Find a comfortable place, away from all distractions and noise. · Lie down on your back, or sit with your back straight. · Focus on your breathing. Make it slow and steady. · Breathe in through your nose.  Breathe out through either your nose or mouth. · Breathe deeply, filling up the area between your navel and your rib cage. Breathe so that your belly goes up and down. · Do not hold your breath. · Breathe like this for 5 to 10 minutes. Notice the feeling of calmness throughout your whole body. As you continue to breathe slowly and deeply, relax by doing the following for another 5 to 10 minutes:  · Tighten and relax each muscle group in your body. You can begin at your toes and work your way up to your head. · Imagine your muscle groups relaxing and becoming heavy. · Empty your mind of all thoughts. · Let yourself relax more and more deeply. · Become aware of the state of calmness that surrounds you. · When your relaxation time is over, you can bring yourself back to alertness by moving your fingers and toes and then your hands and feet and then stretching and moving your entire body. Sometimes people fall asleep during relaxation, but they usually wake up shortly afterward. · Always give yourself time to return to full alertness before you drive a car or do anything that might cause an accident if you are not fully alert. Never play a relaxation tape while you drive a car. When should you call for help? Call 911 anytime you think you may need emergency care. For example, call if:    · You feel you cannot stop from hurting yourself or someone else. Keep the numbers for these national suicide hotlines: 4-841-867-TALK (5-936.349.5882) and 0-892-QPLYONZ (2-249.412.6230). If you or someone you know talks about suicide or feeling hopeless, get help right away. Watch closely for changes in your health, and be sure to contact your doctor if:    · You have anxiety or fear that affects your life.     · You have symptoms of anxiety that are new or different from those you had before. Where can you learn more?   Go to http://www.gray.com/  Enter P754 in the search box to learn more about \"Anxiety Disorder: Care Instructions. \"  Current as of: September 23, 2020               Content Version: 12.8  © 3153-5771 [employer]. Care instructions adapted under license by [employer] (which disclaims liability or warranty for this information). If you have questions about a medical condition or this instruction, always ask your healthcare professional. [address] any warranty or liability for your use of this information. Well Visit, Ages 25 to 48: Care Instructions  Overview     Well visits can help you stay healthy. Your doctor has checked your overall health and may have suggested ways to take good care of yourself. Your doctor also may have recommended tests. At home, you can help prevent illness with healthy eating, regular exercise, and other steps. Follow-up care is a key part of your treatment and safety. Be sure to make and go to all appointments, and call your doctor if you are having problems. It's also a good idea to know your test results and keep a list of the medicines you take. How can you care for yourself at home? · Get screening tests that you and your doctor decide on. Screening helps find diseases before any symptoms appear. · Eat healthy foods. Choose fruits, vegetables, whole grains, protein, and low-fat dairy foods. Limit fat, especially saturated fat. Reduce salt in your diet. · Limit alcohol. If you are a man, have no more than 2 drinks a day or 14 drinks a week. If you are a woman, have no more than 1 drink a day or 7 drinks a week. · Get at least 30 minutes of physical activity on most days of the week. Walking is a good choice. You also may want to do other activities, such as running, swimming, cycling, or playing tennis or team sports. Discuss any changes in your exercise program with your doctor. · Reach and stay at a healthy weight.  This will lower your risk for many problems, such as obesity, diabetes, heart disease, and high blood pressure. · Do not smoke or allow others to smoke around you. If you need help quitting, talk to your doctor about stop-smoking programs and medicines. These can increase your chances of quitting for good. · Care for your mental health. It is easy to get weighed down by worry and stress. Learn strategies to manage stress, like deep breathing and mindfulness, and stay connected with your family and community. If you find you often feel sad or hopeless, talk with your doctor. Treatment can help. · Talk to your doctor about whether you have any risk factors for sexually transmitted infections (STIs). You can help prevent STIs if you wait to have sex with a new partner (or partners) until you've each been tested for STIs. It also helps if you use condoms (male or female condoms) and if you limit your sex partners to one person who only has sex with you. Vaccines are available for some STIs, such as HPV. · Use birth control if it's important to you to prevent pregnancy. Talk with your doctor about the choices available and what might be best for you. · If you think you may have a problem with alcohol or drug use, talk to your doctor. This includes prescription medicines (such as amphetamines and opioids) and illegal drugs (such as cocaine and methamphetamine). Your doctor can help you figure out what type of treatment is best for you. · Protect your skin from too much sun. When you're outdoors from 10 a.m. to 4 p.m., stay in the shade or cover up with clothing and a hat with a wide brim. Wear sunglasses that block UV rays. Even when it's cloudy, put broad-spectrum sunscreen (SPF 30 or higher) on any exposed skin. · See a dentist one or two times a year for checkups and to have your teeth cleaned. · Wear a seat belt in the car. When should you call for help? Watch closely for changes in your health, and be sure to contact your doctor if you have any problems or symptoms that concern you.   Where can you learn more?  Go to http://www.gray.com/  Enter P072 in the search box to learn more about \"Well Visit, Ages 25 to 48: Care Instructions. \"  Current as of: May 27, 2020               Content Version: 12.8  © 0326-0272 [employer]. Care instructions adapted under license by [employer] (which disclaims liability or warranty for this information). If you have questions about a medical condition or this instruction, always ask your healthcare professional. [place] [address] any warranty or liability for your use of this information. Please keep your follow-up appointment with Nohemi Lopez NP. Health Maintenance Due   Topic Date Due    Hepatitis C Screening  Never done    Pneumococcal 0-64 years (1 of 2 - PPSV23) Never done    COVID-19 Vaccine (1) Never done    DTaP/Tdap/Td series (1 - Tdap) 08/10/2012    Lipid Screen  09/11/2020       I have discussed the diagnosis with the patient and the intended plan as seen in the above orders. Patient is in agreement. The patient has received an after-visit summary and questions were answered concerning future plans. I have discussed medication side effects and warnings with the patient as well. Warning signs for the above conditions were discussed including when to call our office or go to the emergency room. The nurse provided the patient and/or family with advanced directive information if needed and encouraged the patient to provide a copy to the office when available.

## 2021-08-16 NOTE — TELEPHONE ENCOUNTER
----- Message from Yves Peterson MD sent at 8/14/2021  5:20 PM EDT -----  Her blood pressure is still high we need to do some blood test for secondary hypertension.        Check TSH, T3 and T4, plasma metanephrine, aldosterone/renin level    She is not taking florinef and midodrine     norvasc 5 mg every day for now for high bp

## 2021-08-17 ENCOUNTER — OFFICE VISIT (OUTPATIENT)
Dept: INTERNAL MEDICINE CLINIC | Age: 41
End: 2021-08-17
Payer: COMMERCIAL

## 2021-08-17 VITALS
HEIGHT: 63 IN | WEIGHT: 190.4 LBS | TEMPERATURE: 98.3 F | DIASTOLIC BLOOD PRESSURE: 84 MMHG | OXYGEN SATURATION: 98 % | SYSTOLIC BLOOD PRESSURE: 133 MMHG | BODY MASS INDEX: 33.73 KG/M2 | RESPIRATION RATE: 16 BRPM | HEART RATE: 95 BPM

## 2021-08-17 DIAGNOSIS — Z00.00 ROUTINE PHYSICAL EXAMINATION: Primary | ICD-10-CM

## 2021-08-17 DIAGNOSIS — I10 HYPERTENSION, UNSPECIFIED TYPE: ICD-10-CM

## 2021-08-17 DIAGNOSIS — F41.9 ANXIETY: ICD-10-CM

## 2021-08-17 PROCEDURE — 99395 PREV VISIT EST AGE 18-39: CPT | Performed by: NURSE PRACTITIONER

## 2021-08-17 RX ORDER — BUSPIRONE HYDROCHLORIDE 5 MG/1
5 TABLET ORAL 2 TIMES DAILY
Qty: 60 TABLET | Refills: 1 | Status: SHIPPED | OUTPATIENT
Start: 2021-08-17 | End: 2021-10-19

## 2021-08-17 NOTE — PATIENT INSTRUCTIONS
Anxiety Disorder: Care Instructions  Your Care Instructions     Anxiety is a normal reaction to stress. Difficult situations can cause you to have symptoms such as sweaty palms and a nervous feeling. In an anxiety disorder, the symptoms are far more severe. Constant worry, muscle tension, trouble sleeping, nausea and diarrhea, and other symptoms can make normal daily activities difficult or impossible. These symptoms may occur for no reason, and they can affect your work, school, or social life. Medicines, counseling, and self-care can all help. Follow-up care is a key part of your treatment and safety. Be sure to make and go to all appointments, and call your doctor if you are having problems. It's also a good idea to know your test results and keep a list of the medicines you take. How can you care for yourself at home? · Take medicines exactly as directed. Call your doctor if you think you are having a problem with your medicine. · Go to your counseling sessions and follow-up appointments. · Recognize and accept your anxiety. Then, when you are in a situation that makes you anxious, say to yourself, \"This is not an emergency. I feel uncomfortable, but I am not in danger. I can keep going even if I feel anxious. \"  · Be kind to your body:  ? Relieve tension with exercise or a massage. ? Get enough rest.  ? Avoid alcohol, caffeine, nicotine, and illegal drugs. They can increase your anxiety level and cause sleep problems. ? Learn and do relaxation techniques. See below for more about these techniques. · Engage your mind. Get out and do something you enjoy. Go to a funny movie, or take a walk or hike. Plan your day. Having too much or too little to do can make you anxious. · Keep a record of your symptoms. Discuss your fears with a good friend or family member, or join a support group for people with similar problems. Talking to others sometimes relieves stress.   · Get involved in social groups, or volunteer to help others. Being alone sometimes makes things seem worse than they are. · Get at least 30 minutes of exercise on most days of the week to relieve stress. Walking is a good choice. You also may want to do other activities, such as running, swimming, cycling, or playing tennis or team sports. Relaxation techniques  Do relaxation exercises 10 to 20 minutes a day. You can play soothing, relaxing music while you do them, if you wish. · Tell others in your house that you are going to do your relaxation exercises. Ask them not to disturb you. · Find a comfortable place, away from all distractions and noise. · Lie down on your back, or sit with your back straight. · Focus on your breathing. Make it slow and steady. · Breathe in through your nose. Breathe out through either your nose or mouth. · Breathe deeply, filling up the area between your navel and your rib cage. Breathe so that your belly goes up and down. · Do not hold your breath. · Breathe like this for 5 to 10 minutes. Notice the feeling of calmness throughout your whole body. As you continue to breathe slowly and deeply, relax by doing the following for another 5 to 10 minutes:  · Tighten and relax each muscle group in your body. You can begin at your toes and work your way up to your head. · Imagine your muscle groups relaxing and becoming heavy. · Empty your mind of all thoughts. · Let yourself relax more and more deeply. · Become aware of the state of calmness that surrounds you. · When your relaxation time is over, you can bring yourself back to alertness by moving your fingers and toes and then your hands and feet and then stretching and moving your entire body. Sometimes people fall asleep during relaxation, but they usually wake up shortly afterward. · Always give yourself time to return to full alertness before you drive a car or do anything that might cause an accident if you are not fully alert.  Never play a relaxation tape while you drive a car. When should you call for help? Call 911 anytime you think you may need emergency care. For example, call if:    · You feel you cannot stop from hurting yourself or someone else. Keep the numbers for these national suicide hotlines: 5-511-445-TALK (0-308-074-554.260.4415) and 6-720-AOVPWWA (3-696.437.4223). If you or someone you know talks about suicide or feeling hopeless, get help right away. Watch closely for changes in your health, and be sure to contact your doctor if:    · You have anxiety or fear that affects your life.     · You have symptoms of anxiety that are new or different from those you had before. Where can you learn more? Go to http://www.galeana.com/  Enter P754 in the search box to learn more about \"Anxiety Disorder: Care Instructions. \"  Current as of: September 23, 2020               Content Version: 12.8  © 2006-2021 [employer]. Care instructions adapted under license by [employer] (which disclaims liability or warranty for this information). If you have questions about a medical condition or this instruction, always ask your healthcare professional. [place] [address] any warranty or liability for your use of this information. Well Visit, Ages 25 to 48: Care Instructions  Overview     Well visits can help you stay healthy. Your doctor has checked your overall health and may have suggested ways to take good care of yourself. Your doctor also may have recommended tests. At home, you can help prevent illness with healthy eating, regular exercise, and other steps. Follow-up care is a key part of your treatment and safety. Be sure to make and go to all appointments, and call your doctor if you are having problems. It's also a good idea to know your test results and keep a list of the medicines you take. How can you care for yourself at home?   · Get screening tests that you and your doctor decide on. Screening helps find diseases before any symptoms appear. · Eat healthy foods. Choose fruits, vegetables, whole grains, protein, and low-fat dairy foods. Limit fat, especially saturated fat. Reduce salt in your diet. · Limit alcohol. If you are a man, have no more than 2 drinks a day or 14 drinks a week. If you are a woman, have no more than 1 drink a day or 7 drinks a week. · Get at least 30 minutes of physical activity on most days of the week. Walking is a good choice. You also may want to do other activities, such as running, swimming, cycling, or playing tennis or team sports. Discuss any changes in your exercise program with your doctor. · Reach and stay at a healthy weight. This will lower your risk for many problems, such as obesity, diabetes, heart disease, and high blood pressure. · Do not smoke or allow others to smoke around you. If you need help quitting, talk to your doctor about stop-smoking programs and medicines. These can increase your chances of quitting for good. · Care for your mental health. It is easy to get weighed down by worry and stress. Learn strategies to manage stress, like deep breathing and mindfulness, and stay connected with your family and community. If you find you often feel sad or hopeless, talk with your doctor. Treatment can help. · Talk to your doctor about whether you have any risk factors for sexually transmitted infections (STIs). You can help prevent STIs if you wait to have sex with a new partner (or partners) until you've each been tested for STIs. It also helps if you use condoms (male or female condoms) and if you limit your sex partners to one person who only has sex with you. Vaccines are available for some STIs, such as HPV. · Use birth control if it's important to you to prevent pregnancy. Talk with your doctor about the choices available and what might be best for you.   · If you think you may have a problem with alcohol or drug use, talk to your doctor. This includes prescription medicines (such as amphetamines and opioids) and illegal drugs (such as cocaine and methamphetamine). Your doctor can help you figure out what type of treatment is best for you. · Protect your skin from too much sun. When you're outdoors from 10 a.m. to 4 p.m., stay in the shade or cover up with clothing and a hat with a wide brim. Wear sunglasses that block UV rays. Even when it's cloudy, put broad-spectrum sunscreen (SPF 30 or higher) on any exposed skin. · See a dentist one or two times a year for checkups and to have your teeth cleaned. · Wear a seat belt in the car. When should you call for help? Watch closely for changes in your health, and be sure to contact your doctor if you have any problems or symptoms that concern you. Where can you learn more? Go to http://www.galeana.com/  Enter P072 in the search box to learn more about \"Well Visit, Ages 25 to 48: Care Instructions. \"  Current as of: May 27, 2020               Content Version: 12.8  © 5376-1988 [employer]. Care instructions adapted under license by [employer] (which disclaims liability or warranty for this information). If you have questions about a medical condition or this instruction, always ask your healthcare professional. [address] any warranty or liability for your use of this information.

## 2021-08-17 NOTE — PROGRESS NOTES
Lestre Haynes  Identified pt with two pt identifiers(name and ). Chief Complaint   Patient presents with    Physical     Room 1A //        Reviewed record In preparation for visit and have obtained necessary documentation. 1. Have you been to the ER, urgent care clinic or hospitalized since your last visit? No     2. Have you seen or consulted any other health care providers outside of the [address] [place] [address] since your last visit? Include any pap smears or colon screening. Yes. Cardiology - Dr. Venus Coronel     Patient does not have an advance directive. Vitals reviewed with provider.     Health Maintenance reviewed:     Health Maintenance Due   Topic    Hepatitis C Screening     Pneumococcal 0-64 years (1 of 2 - PPSV23)    COVID-19 Vaccine (1)    DTaP/Tdap/Td series (1 - Tdap)    Lipid Screen           Wt Readings from Last 3 Encounters:   21 190 lb 6.4 oz (86.4 kg)   21 193 lb (87.5 kg)   10/15/19 197 lb 12.8 oz (89.7 kg)        Temp Readings from Last 3 Encounters:   21 98.3 °F (36.8 °C) (Oral)   10/07/20 98.8 °F (37.1 °C)   10/15/19 98 °F (36.7 °C) (Oral)        BP Readings from Last 3 Encounters:   21 133/84   21 (!) 152/108   10/15/19 121/85        Pulse Readings from Last 3 Encounters:   21 95   10/07/20 (!) 53   10/15/19 73        Vitals:    21 0753   BP: 133/84   Pulse: 95   Resp: 16   Temp: 98.3 °F (36.8 °C)   TempSrc: Oral   SpO2: 98%   Weight: 190 lb 6.4 oz (86.4 kg)   Height: 5' 3\" (1.6 m)   PainSc:   0 - No pain   LMP: 08/15/2021          Learning Assessment:   :       Learning Assessment 2019   PRIMARY LEARNER Patient   BARRIERS PRIMARY LEARNER NONE   CO-LEARNER CAREGIVER No   PRIMARY LANGUAGE ENGLISH   LEARNER PREFERENCE PRIMARY LISTENING   ANSWERED BY self   RELATIONSHIP SELF        Depression Screening:   :       3 most recent PHQ Screens 2021   Little interest or pleasure in doing things Nearly every day   Feeling down, depressed, irritable, or hopeless Nearly every day   Total Score PHQ 2 6        Fall Risk Assessment:   :     No flowsheet data found. Abuse Screening:   :     No flowsheet data found.      ADL Screening:   :       ADL Assessment 8/17/2021   Feeding yourself No Help Needed   Getting from bed to chair No Help Needed   Getting dressed No Help Needed   Bathing or showering No Help Needed   Walk across the room (includes cane/walker) No Help Needed   Using the telphone No Help Needed   Taking your medications No Help Needed   Preparing meals No Help Needed   Managing money (expenses/bills) No Help Needed   Moderately strenuous housework (laundry) No Help Needed   Shopping for personal items (toiletries/medicines) No Help Needed   Shopping for groceries No Help Needed   Driving No Help Needed   Climbing a flight of stairs No Help Needed   Getting to places beyond walking distances No Help Needed

## 2021-08-20 LAB
ALBUMIN SERPL-MCNC: 4.2 G/DL (ref 3.8–4.8)
ALBUMIN/GLOB SERPL: 1.6 {RATIO} (ref 1.2–2.2)
ALP SERPL-CCNC: 70 IU/L (ref 48–121)
ALT SERPL-CCNC: 34 IU/L (ref 0–32)
AST SERPL-CCNC: 28 IU/L (ref 0–40)
BILIRUB SERPL-MCNC: 0.3 MG/DL (ref 0–1.2)
BUN SERPL-MCNC: 10 MG/DL (ref 6–20)
BUN/CREAT SERPL: 11 (ref 9–23)
CALCIUM SERPL-MCNC: 9.3 MG/DL (ref 8.7–10.2)
CHLORIDE SERPL-SCNC: 102 MMOL/L (ref 96–106)
CHOLEST SERPL-MCNC: 217 MG/DL (ref 100–199)
CO2 SERPL-SCNC: 24 MMOL/L (ref 20–29)
CREAT SERPL-MCNC: 0.94 MG/DL (ref 0.57–1)
GLOBULIN SER CALC-MCNC: 2.7 G/DL (ref 1.5–4.5)
GLUCOSE SERPL-MCNC: 93 MG/DL (ref 65–99)
HDLC SERPL-MCNC: 54 MG/DL
LDLC SERPL CALC-MCNC: 148 MG/DL (ref 0–99)
POTASSIUM SERPL-SCNC: 4.1 MMOL/L (ref 3.5–5.2)
PROT SERPL-MCNC: 6.9 G/DL (ref 6–8.5)
SODIUM SERPL-SCNC: 140 MMOL/L (ref 134–144)
TRIGL SERPL-MCNC: 83 MG/DL (ref 0–149)
VLDLC SERPL CALC-MCNC: 15 MG/DL (ref 5–40)

## 2021-08-20 NOTE — PROGRESS NOTES
Pls let patient know her blood sugar, kidney, and liver labs are all normal. Keep up the good work! Please let patient know her cholesterol is elevated. Elevated cholesterol increases risk for heart disease, heart attack and stroke. Ways to decrease cholesterol include decreasing saturated fats in the diet found in many packaged foods and fried foods, increasing exercise (whcih helps raise our good protective cholesterol) and weight loss if indicated. Eating a diet high in vegetables and fruits is also helpful.

## 2021-08-23 ENCOUNTER — TELEPHONE (OUTPATIENT)
Dept: CARDIOLOGY CLINIC | Age: 41
End: 2021-08-23

## 2021-08-24 ENCOUNTER — TELEPHONE (OUTPATIENT)
Dept: CARDIOLOGY CLINIC | Age: 41
End: 2021-08-24

## 2021-08-24 LAB
ALDOST SERPL-MCNC: 3.1 NG/DL (ref 0–30)
ALDOST/RENIN PLAS-RTO: 0.3 {RATIO} (ref 0–30)
METANEPH FREE SERPL-MCNC: 26.5 PG/ML (ref 0–88)
NORMETANEPHRINE SERPL-MCNC: 189.8 PG/ML (ref 0–110.1)
RENIN PLAS-CCNC: 9.4 NG/ML/HR (ref 0.17–5.38)
T3FREE SERPL-MCNC: 2.7 PG/ML (ref 2–4.4)
T4 FREE SERPL-MCNC: 1.13 NG/DL (ref 0.82–1.77)
TSH SERPL DL<=0.005 MIU/L-ACNC: 1.69 UIU/ML (ref 0.45–4.5)

## 2021-08-24 NOTE — PROGRESS NOTES
Normetanephrine level is mildly elevated while metanephrine is normal as well as thyroid levels  This may be why she has sinus tach  Await for holter, echo and will follow up with her after  Future Appointments  9/1/2021   8:00 AM    VILLA PUTNAM AMB  10/19/2021 8:00 AM    MD EVER Adam AMB

## 2021-08-24 NOTE — TELEPHONE ENCOUNTER
Verified patient with two types of identifiers. Notified patient of results and MD recommendations. Patient verbalized understanding and will call with any other questions.       Future Appointments   Date Time Provider Allegra Banuelos   9/1/2021  8:00 AM VILLA PUTNAM AMB   9/1/2021  9:00 AM HOLTER, REYNOLDS CAVREY BS AMB   10/19/2021  8:00 AM MD EVER Davies AMB

## 2021-08-24 NOTE — TELEPHONE ENCOUNTER
----- Message from Rachelle Humphrey MD sent at 8/24/2021  7:47 AM EDT -----  Normetanephrine level is mildly elevated while metanephrine is normal as well as thyroid levels  This may be why she has sinus tach  Await for holter, echo and will follow up with her after  Future Appointments  9/1/2021   8:00 AM    VILLA PUTNAM AMB  10/19/2021 8:00 AM    MD EVER Trujillo AMB

## 2021-09-01 ENCOUNTER — TELEPHONE (OUTPATIENT)
Dept: CARDIOLOGY CLINIC | Age: 41
End: 2021-09-01

## 2021-09-01 ENCOUNTER — CLINICAL SUPPORT (OUTPATIENT)
Dept: CARDIOLOGY CLINIC | Age: 41
End: 2021-09-01

## 2021-09-01 ENCOUNTER — ANCILLARY PROCEDURE (OUTPATIENT)
Dept: CARDIOLOGY CLINIC | Age: 41
End: 2021-09-01
Payer: COMMERCIAL

## 2021-09-01 VITALS
DIASTOLIC BLOOD PRESSURE: 84 MMHG | WEIGHT: 190 LBS | HEIGHT: 63 IN | SYSTOLIC BLOOD PRESSURE: 132 MMHG | BODY MASS INDEX: 33.66 KG/M2

## 2021-09-01 DIAGNOSIS — R00.0 TACHYCARDIA: Primary | ICD-10-CM

## 2021-09-01 DIAGNOSIS — R42 DIZZINESS: ICD-10-CM

## 2021-09-01 DIAGNOSIS — I10 HYPERTENSION, UNSPECIFIED TYPE: ICD-10-CM

## 2021-09-01 PROCEDURE — 93227 XTRNL ECG REC<48 HR R&I: CPT | Performed by: INTERNAL MEDICINE

## 2021-09-01 PROCEDURE — 93306 TTE W/DOPPLER COMPLETE: CPT | Performed by: INTERNAL MEDICINE

## 2021-09-01 PROCEDURE — 93225 XTRNL ECG REC<48 HRS REC: CPT | Performed by: INTERNAL MEDICINE

## 2021-09-01 NOTE — TELEPHONE ENCOUNTER
Verified patient with two types of identifiers. Notified patient of NP recommendations. Patient states that she is fine to wait for the results of the Echo and holter for final recommendations. Patient verbalized understanding and will call with any other questions.

## 2021-09-01 NOTE — TELEPHONE ENCOUNTER
On eval, she's had mildly elevated normetanephrine, but normal metanephrine & thyroid. Dr. Sejal Garcia started her on amlodipine approx 2 weeks ago. BP had been recently elevated, but previously had orthostatic hypotension. Holter monitor was placed today & echo was done to eval for structural cardiac abnormalities. If she wants to switch now, she could try diltiazem  mg po daily instead. It's still a CCB, but has lower tendency for edema (though some patients do still experience it). If she doesn't mind waiting a couple days until echo has been read & holter has been analyzed, Dr. Sejal Garcia may be able to make a different recommendation. That way she wouldn't have something new filled just to change again in the near future.

## 2021-09-01 NOTE — TELEPHONE ENCOUNTER
Claudell Matter, [employer] states patient was in this morning and reports swelling since starting Norvasc 5 mg daily    Verified patient with two types of identifiers. Patient states her hands and lower ankles have been swelling since starting Norvasc 5 mg daily. Patient is unable to get wedding band on her hand. BP have been better controled: 8/31/21 128/80, 9/1/21 118/90. Will notify MD/NP for possible alternative. Verified current allergies

## 2021-09-02 ENCOUNTER — TELEPHONE (OUTPATIENT)
Dept: CARDIOLOGY CLINIC | Age: 41
End: 2021-09-02

## 2021-09-02 LAB
ECHO AO ASC DIAM: 2.78 CM
ECHO AO ROOT DIAM: 3.16 CM
ECHO AV AREA PEAK VELOCITY: 2.96 CM2
ECHO AV AREA VTI: 2.78 CM2
ECHO AV AREA/BSA PEAK VELOCITY: 1.6 CM2/M2
ECHO AV AREA/BSA VTI: 1.5 CM2/M2
ECHO AV MEAN GRADIENT: 4.8 MMHG
ECHO AV PEAK GRADIENT: 8.4 MMHG
ECHO AV PEAK VELOCITY: 144.92 CM/S
ECHO AV VTI: 30.93 CM
ECHO IVC PROX: 0.93 CM
ECHO LA AREA 4C: 15.26 CM2
ECHO LA MAJOR AXIS: 3.69 CM
ECHO LA MINOR AXIS: 1.95 CM
ECHO LA VOL 2C: 33.44 ML (ref 22–52)
ECHO LA VOL 4C: 35.42 ML (ref 22–52)
ECHO LA VOL BP: 36.9 ML (ref 22–52)
ECHO LA VOL/BSA BIPLANE: 19.52 ML/M2 (ref 16–28)
ECHO LA VOLUME INDEX A2C: 17.69 ML/M2 (ref 16–28)
ECHO LA VOLUME INDEX A4C: 18.74 ML/M2 (ref 16–28)
ECHO LV E' LATERAL VELOCITY: 13.59 CM/S
ECHO LV E' SEPTAL VELOCITY: 9.98 CM/S
ECHO LV EDV A2C: 71.25 ML
ECHO LV EDV A4C: 74.84 ML
ECHO LV EDV BP: 74.39 ML (ref 56–104)
ECHO LV EDV INDEX A4C: 39.6 ML/M2
ECHO LV EDV INDEX BP: 39.4 ML/M2
ECHO LV EDV NDEX A2C: 37.7 ML/M2
ECHO LV EJECTION FRACTION A2C: 62 PERCENT
ECHO LV EJECTION FRACTION A4C: 75 PERCENT
ECHO LV EJECTION FRACTION BIPLANE: 68.9 PERCENT (ref 55–100)
ECHO LV ESV A2C: 26.93 ML
ECHO LV ESV A4C: 18.85 ML
ECHO LV ESV BP: 23.1 ML (ref 19–49)
ECHO LV ESV INDEX A2C: 14.2 ML/M2
ECHO LV ESV INDEX A4C: 10 ML/M2
ECHO LV ESV INDEX BP: 12.2 ML/M2
ECHO LV INTERNAL DIMENSION DIASTOLIC: 3.66 CM (ref 3.9–5.3)
ECHO LV INTERNAL DIMENSION SYSTOLIC: 2.31 CM
ECHO LV IVSD: 1.01 CM (ref 0.6–0.9)
ECHO LV MASS 2D: 109.1 G (ref 67–162)
ECHO LV MASS INDEX 2D: 57.7 G/M2 (ref 43–95)
ECHO LV POSTERIOR WALL DIASTOLIC: 0.97 CM (ref 0.6–0.9)
ECHO LVOT DIAM: 2.05 CM
ECHO LVOT PEAK GRADIENT: 6.75 MMHG
ECHO LVOT PEAK VELOCITY: 129.93 CM/S
ECHO LVOT SV: 86 ML
ECHO LVOT VTI: 26.06 CM
ECHO MV A VELOCITY: 63.71 CM/S
ECHO MV AREA PHT: 3.79 CM2
ECHO MV E DECELERATION TIME (DT): 199.97 MS
ECHO MV E VELOCITY: 83.22 CM/S
ECHO MV E/A RATIO: 1.31
ECHO MV E/E' LATERAL: 6.12
ECHO MV E/E' RATIO (AVERAGED): 7.23
ECHO MV E/E' SEPTAL: 8.34
ECHO MV PRESSURE HALF TIME (PHT): 57.99 MS
ECHO RV TAPSE: 1.92 CM (ref 1.5–2)
LA VOL DISK BP: 35.23 ML (ref 22–52)
LVOT MG: 4.11 MMHG

## 2021-09-02 NOTE — TELEPHONE ENCOUNTER
----- Message from Cherylle Skiff, MD sent at 9/2/2021  1:28 PM EDT -----  Normal echo    Future Appointments  10/19/2021 8:00 AM    MD EVER Adam AMB

## 2021-09-08 ENCOUNTER — DOCUMENTATION ONLY (OUTPATIENT)
Dept: CARDIOLOGY CLINIC | Age: 41
End: 2021-09-08

## 2021-09-08 RX ORDER — DILTIAZEM HYDROCHLORIDE 120 MG/1
120 CAPSULE, COATED, EXTENDED RELEASE ORAL DAILY
Qty: 30 CAPSULE | Refills: 3 | Status: SHIPPED | OUTPATIENT
Start: 2021-09-08 | End: 2021-12-28 | Stop reason: SDUPTHER

## 2021-09-08 NOTE — PROGRESS NOTES
holter sinus , average 86 bpm  3 PACsno significant arrhythmia on this  Future Appointments   Date Time Provider Allegra Banuelos   10/19/2021  8:00 AM MD EVER Cunha AMB

## 2021-09-08 NOTE — PROGRESS NOTES
Verified patient with two types of identifiers. Notified patient of results. Patient states that swelling has worsened on the Norvasc and requesting alternative previously discussed with NP \" If she wants to switch now, she could try diltiazem  mg po daily instead. \" Patient will stop Norvasc and start Diltiazem 120 mg daily. Patient to call if symptoms don't improve or if BP is not well controlled. Patient verbalized understanding and will call with any other questions.       Future Appointments   Date Time Provider Allegra Banuelos   10/19/2021  8:00 AM MD EVER Adam AMB

## 2021-10-19 ENCOUNTER — OFFICE VISIT (OUTPATIENT)
Dept: CARDIOLOGY CLINIC | Age: 41
End: 2021-10-19
Payer: COMMERCIAL

## 2021-10-19 VITALS
HEIGHT: 63 IN | RESPIRATION RATE: 14 BRPM | DIASTOLIC BLOOD PRESSURE: 88 MMHG | OXYGEN SATURATION: 96 % | BODY MASS INDEX: 34.02 KG/M2 | HEART RATE: 88 BPM | WEIGHT: 192 LBS | SYSTOLIC BLOOD PRESSURE: 134 MMHG

## 2021-10-19 DIAGNOSIS — I10 HYPERTENSION, UNSPECIFIED TYPE: Primary | ICD-10-CM

## 2021-10-19 DIAGNOSIS — R00.0 SINUS TACHYCARDIA: ICD-10-CM

## 2021-10-19 DIAGNOSIS — E78.5 HYPERLIPIDEMIA, UNSPECIFIED HYPERLIPIDEMIA TYPE: ICD-10-CM

## 2021-10-19 PROCEDURE — 99214 OFFICE O/P EST MOD 30 MIN: CPT | Performed by: INTERNAL MEDICINE

## 2021-10-19 RX ORDER — ATORVASTATIN CALCIUM 20 MG/1
20 TABLET, FILM COATED ORAL DAILY
Qty: 90 TABLET | Refills: 3 | Status: SHIPPED | OUTPATIENT
Start: 2021-10-19 | End: 2022-10-28

## 2021-10-19 NOTE — PROGRESS NOTES
Cardiac Electrophysiology OFFICE Note     Subjective:      Paolo Adams is a 46 y.o. patient who is seen for evaluation for POTS  She has mildly elevated normetanephrine   Echo was normal  bp fluctuating at home  She tried to lose weight but     She said that her symptoms of dizziness was felt to be orthostatic hypotension and she showed me the vital signs back in December when Dr. Daniele Child took care of her at [facility].   She had tendency to drop her blood pressures to the low 90 mmHg  She did not have tachycardia with the vital signs that she took at home. Lately the patient ran out of Florinef and midodrine and her blood pressure is very high sometimes 157 mmHg. She does not have sinus tachycardia. EKG today showed normal sinus rhythm. She is under a lot of stress at work and at home and has been to [facility] a couple of weeks ago and was told that she need to be having a tilt table test for POTS. 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 She said that she has anxiety and depression and would like her PCP to change her Lexapro to something else because it is not effective. She is concerned about the fluctuation of her blood pressure       Patient Active Problem List   Diagnosis Code    DDD (degenerative disc disease), lumbar M51.36    Herniated nucleus pulposus, L5-S1 M51.27    Right sided sciatica M54.31    Severe low back pain M54.50    Strain of lumbar region S39.012A    Severe obesity ([facility] [address] E66.01    Hypercholesteremia E78.00    HTN (hypertension) I10    Depression F32. A     Current Outpatient Medications   Medication Sig Dispense Refill    atorvastatin (LIPITOR) 20 mg tablet Take 1 Tablet by mouth daily. 90 Tablet 3    dilTIAZem ER (CARDIZEM CD) 120 mg capsule Take 1 Capsule by mouth daily.  30 Capsule 3     Allergies   Allergen Reactions    Cephalexin Shortness of Breath     Past Medical History: Diagnosis Date    Depression     HTN (hypertension)     Hypercholesteremia     Rosacea      Past Surgical History:   Procedure Laterality Date    HX  SECTION      HX GYN      tubal ligation     Family History   Problem Relation Age of Onset    Hypertension Mother     Thyroid Disease Mother     Hypertension Father     Heart Disease Father     Cancer Father         bone, lung cancer, throat     Stroke Father     Heart Attack Paternal Grandfather         55     Social History     Tobacco Use    Smoking status: Current Every Day Smoker     Packs/day: 0.25     Years: 10.00     Pack years: 2.50    Smokeless tobacco: Never Used    Tobacco comment: 3-5 cigarettes/day    Substance Use Topics    Alcohol use: Not Currently        Review of Systems:   Constitutional: Negative for fever, chills, weight loss, + malaise/fatigue. HEENT: Negative for nosebleeds, vision changes. Respiratory: Negative for cough, hemoptysis  Cardiovascular: Negative for chest pain, palpitations, orthopnea, claudication, leg swelling, syncope, and PND. Gastrointestinal: Negative for nausea, vomiting, diarrhea, blood in stool and melena. Genitourinary: Negative for dysuria, and hematuria. Musculoskeletal: Negative for myalgias, arthralgia. Skin: Negative for rash. Heme: Does not bleed or bruise easily. Neurological: Negative for speech change and focal weakness     Objective:     Visit Vitals  /88 (BP 1 Location: Right arm, BP Patient Position: Sitting)   Pulse 88   Resp 14   Ht 5' 3\" (1.6 m)   Wt 192 lb (87.1 kg)   SpO2 96%   BMI 34.01 kg/m²      Physical Exam:   Constitutional: well-developed and well-nourished. No respiratory distress. Head: Normocephalic and atraumatic. Eyes: Pupils are equal, round  ENT: hearing normal  Neck: supple. No JVD present. Cardiovascular: Normal rate, regular rhythm. Exam reveals no gallop and no friction rub. No murmur heard.   Pulmonary/Chest: Effort normal and breath sounds normal. No wheezes. Abdominal: Soft, mild obesity  Musculoskeletal: no edema. Neurological: alert,oriented. Skin: Skin is warm and dry  Psychiatric: normal mood and affect. Behavior is normal. Judgment and thought content normal.      Assessment/Plan:       ICD-10-CM ICD-9-CM    1. Hypertension, unspecified type  I10 401.9    2. Hyperlipidemia, unspecified hyperlipidemia type  E78.5 272.4    3. Sinus tachycardia  R00.0 427.89       ECG NSR normal intervals  Echo was normal  She does not have sinus tachycardia with upright posture frequently and so I did not yet recommend her doing a tilt table test.   She had a holter   holter sinus , average 86 bpm  3 PACs no significant arrhythmia on this    No secondary hypertension by blood test.   TSH, T3 and T4, plasma metanephrine, aldosterone/renin level    She is not taking florinef and midodrine   She is ok with cardizem    I recommend lipitor 20 mg every day and she agrees      Future Appointments   Date Time Provider Allegra Milleri   10/20/2022  8:00 AM MD EVER Brewster  AMB         Thank you for involving me in this patient's care and please call with further concerns or questions. Olga Elizabeth M.D.   Electrophysiology/Cardiology  [facility] [place]  [address] [place] [address]                             [phone]

## 2021-12-28 RX ORDER — DILTIAZEM HYDROCHLORIDE 120 MG/1
CAPSULE, COATED, EXTENDED RELEASE ORAL
Qty: 30 CAPSULE | Refills: 3 | OUTPATIENT
Start: 2021-12-28

## 2021-12-28 RX ORDER — DILTIAZEM HYDROCHLORIDE 120 MG/1
120 CAPSULE, COATED, EXTENDED RELEASE ORAL DAILY
Qty: 90 CAPSULE | Refills: 1 | Status: SHIPPED | OUTPATIENT
Start: 2021-12-28 | End: 2022-05-10

## 2021-12-28 NOTE — TELEPHONE ENCOUNTER
Request for Cardizem 120 mg daily. Last office visit 10/19/21, next office visit 10/20/22. Refills per verbal order from Dr. Merissa Fuentes.

## 2021-12-28 NOTE — TELEPHONE ENCOUNTER
Sharon Tomlinson from [place] [address] called requesting a refill on the medication. Patient is completely out.        Pharmacy Phone: [phone]

## 2022-03-18 PROBLEM — S39.012A STRAIN OF LUMBAR REGION: Status: ACTIVE | Noted: 2017-10-23

## 2022-03-18 PROBLEM — M51.36 DDD (DEGENERATIVE DISC DISEASE), LUMBAR: Status: ACTIVE | Noted: 2018-07-31

## 2022-03-19 PROBLEM — M54.50 SEVERE LOW BACK PAIN: Status: ACTIVE | Noted: 2018-07-31

## 2022-03-20 PROBLEM — M51.27 HERNIATED NUCLEUS PULPOSUS, L5-S1: Status: ACTIVE | Noted: 2017-10-26

## 2022-03-20 PROBLEM — E66.01 SEVERE OBESITY (HCC): Status: ACTIVE | Noted: 2019-09-11

## 2022-03-20 PROBLEM — M54.31 RIGHT SIDED SCIATICA: Status: ACTIVE | Noted: 2017-11-21

## 2022-05-10 RX ORDER — DILTIAZEM HYDROCHLORIDE 120 MG/1
CAPSULE, COATED, EXTENDED RELEASE ORAL
Qty: 90 CAPSULE | Refills: 1 | Status: SHIPPED | OUTPATIENT
Start: 2022-05-10

## 2022-10-20 ENCOUNTER — OFFICE VISIT (OUTPATIENT)
Dept: CARDIOLOGY CLINIC | Age: 42
End: 2022-10-20
Payer: COMMERCIAL

## 2022-10-20 VITALS
OXYGEN SATURATION: 98 % | WEIGHT: 188.8 LBS | DIASTOLIC BLOOD PRESSURE: 58 MMHG | SYSTOLIC BLOOD PRESSURE: 108 MMHG | HEIGHT: 63 IN | RESPIRATION RATE: 18 BRPM | HEART RATE: 82 BPM | BODY MASS INDEX: 33.45 KG/M2

## 2022-10-20 DIAGNOSIS — R00.2 PALPITATIONS: ICD-10-CM

## 2022-10-20 DIAGNOSIS — R42 LIGHTHEADEDNESS: ICD-10-CM

## 2022-10-20 DIAGNOSIS — G90.A POTS (POSTURAL ORTHOSTATIC TACHYCARDIA SYNDROME): Primary | ICD-10-CM

## 2022-10-20 DIAGNOSIS — I10 HYPERTENSION, UNSPECIFIED TYPE: ICD-10-CM

## 2022-10-20 PROCEDURE — 99213 OFFICE O/P EST LOW 20 MIN: CPT | Performed by: INTERNAL MEDICINE

## 2022-10-20 NOTE — PROGRESS NOTES
Cardiac Electrophysiology OFFICE Note     Subjective:      Estevan Kern is a 38 y.o. patient who is seen for evaluation for POTS  She has mildly elevated normetanephrine   Echo was normal  bp fluctuating at home    She tolerated cardizem  No recurrent sx so far  Tolerate mildly low bp  She had salt tablets to use PRN        She works in a pharmacy as the pharmacy tech and she also research about POTS. She said that she has anxiety and depression and would like her PCP to change her Lexapro to something else because it is not effective. Patient Active Problem List   Diagnosis Code    DDD (degenerative disc disease), lumbar M51.36    Herniated nucleus pulposus, L5-S1 M51.27    Right sided sciatica M54.31    Severe low back pain M54.50    Strain of lumbar region S39.012A    Severe obesity ([address] E66.01    Hypercholesteremia E78.00    HTN (hypertension) I10    Depression F32. A     Current Outpatient Medications   Medication Sig Dispense Refill    dilTIAZem ER (CARDIZEM CD) 120 mg capsule TAKE ONE CAPSULE BY MOUTH EVERY DAY 90 Capsule 1    atorvastatin (LIPITOR) 20 mg tablet Take 1 Tablet by mouth daily.  90 Tablet 3     Allergies   Allergen Reactions    Cephalexin Shortness of Breath     Past Medical History:   Diagnosis Date    Depression     HTN (hypertension)     Hypercholesteremia     Rosacea      Past Surgical History:   Procedure Laterality Date    HX  SECTION      HX GYN      tubal ligation     Family History   Problem Relation Age of Onset    Hypertension Mother     Thyroid Disease Mother     Hypertension Father     Heart Disease Father     Cancer Father         bone, lung cancer, throat     Stroke Father     Heart Attack Paternal Grandfather         55     Social History     Tobacco Use    Smoking status: Every Day     Packs/day: 0.25     Years: 10.00     Pack years: 2.50     Types: Cigarettes    Smokeless tobacco: Never    Tobacco comments:     3-5 cigarettes/day    Substance Use Topics Alcohol use: Not Currently        Review of Systems:   Constitutional: Negative for fever, chills, weight loss   HEENT: Negative for nosebleeds, vision changes. Respiratory: Negative for cough, hemoptysis  Cardiovascular: Negative for chest pain, palpitations, orthopnea, claudication, leg swelling, syncope, and PND. Gastrointestinal: Negative for nausea, vomiting, diarrhea, blood in stool and melena. Genitourinary: Negative for dysuria, and hematuria. Musculoskeletal: Negative for myalgias, arthralgia. Skin: Negative for rash. Heme: Does not bleed or bruise easily. Neurological: Negative for speech change and focal weakness     Objective:     Visit Vitals  BP (!) 108/58 (BP 1 Location: Left upper arm, BP Patient Position: Sitting, BP Cuff Size: Adult)   Pulse 82   Resp 18   Ht 5' 3\" (1.6 m)   Wt 188 lb 12.8 oz (85.6 kg)   SpO2 98%   BMI 33.44 kg/m²      Physical Exam:   Constitutional: well-developed and well-nourished. No respiratory distress. Head: Normocephalic and atraumatic. Eyes: Pupils are equal, round  ENT: hearing normal  Neck: supple. No JVD present. Cardiovascular: Normal rate, regular rhythm. Exam reveals no gallop and no friction rub. No murmur heard. Pulmonary/Chest: Effort normal and breath sounds normal. No wheezes. Abdominal: Soft, mild obesity  Musculoskeletal: no edema. Neurological: alert, oriented. Skin: Skin is warm and dry  Psychiatric: normal mood and affect. Behavior is normal. Judgment and thought content normal.      Assessment/Plan:       ICD-10-CM ICD-9-CM    1. POTS (postural orthostatic tachycardia syndrome)  G90. A 427.89       2. Hypertension, unspecified type  I10 401.9       3. Lightheadedness  R42 780.4       4. Palpitations  R00.2 785. 1          ECG NSR normal intervals  Echo was normal    No secondary hypertension by blood test.      She is not taking florinef and midodrine   She is ok with cardizem and PRN salt tablets         Future Appointments Date Time Provider Allegra Banuelos   11/16/2023  8:20 AM MD [facility]         Thank you for involving me in this patient's care and please call with further concerns or questions. Juliet Landaverde M.D.   Electrophysiology/Cardiology  [facility]  [address] [place] [address],[address] [place] [address]                             [phone]

## 2022-10-28 RX ORDER — ATORVASTATIN CALCIUM 20 MG/1
TABLET, FILM COATED ORAL
Qty: 90 TABLET | Refills: 1 | Status: SHIPPED | OUTPATIENT
Start: 2022-10-28

## 2022-10-28 NOTE — TELEPHONE ENCOUNTER
Request for Atorvastatin 20 mg daily. Last office visit 10/20/22, next office visit 11/16/23. Refills per verbal order from Dr. Wiley Cochran.

## 2022-11-08 NOTE — TELEPHONE ENCOUNTER
----- Message from Harsha Villatoro NP sent at 9/13/2019 11:34 AM EDT -----  Pls let pt know blood sugar, kidney function, thyroid, blood counts are all normal.    I am still waiting on the diabetes test to return. Please let patient know her LDL cholesterol and triglycerides are elevated. Elevated cholesterol increases risk for heart disease, heart attack and stroke. Ways to decrease cholesterol include decreasing saturated fats in the diet found in many packaged foods and fried foods, increasing exercise (whcih helps raise our good protective cholesterol) and weight loss if indicated. Eating a diet high in vegetables and fruits is also helpful. English

## 2022-11-18 RX ORDER — DILTIAZEM HYDROCHLORIDE 120 MG/1
CAPSULE, COATED, EXTENDED RELEASE ORAL
Qty: 90 CAPSULE | Refills: 1 | Status: SHIPPED | OUTPATIENT
Start: 2022-11-18

## 2022-11-18 NOTE — TELEPHONE ENCOUNTER
Received refill request for diltiazem  mg po caps. Refill authorized.     Future Appointments   Date Time Provider Allegra Banuelos   11/16/2023  8:20 AM MD [facility]

## 2023-02-28 RX ORDER — DILTIAZEM HYDROCHLORIDE 120 MG/1
CAPSULE, COATED, EXTENDED RELEASE ORAL
Qty: 90 CAPSULE | Refills: 1 | Status: SHIPPED | OUTPATIENT
Start: 2023-02-28

## 2023-02-28 NOTE — TELEPHONE ENCOUNTER
Received refill request for diltiazem  mg po caps. Refill request authorized.     Future Appointments   Date Time Provider Allegra Banuelos   11/16/2023  8:20 AM MD [facility]

## 2023-03-02 RX ORDER — ATORVASTATIN CALCIUM 20 MG/1
TABLET, FILM COATED ORAL
Qty: 90 TABLET | Refills: 1 | Status: SHIPPED | OUTPATIENT
Start: 2023-03-02

## 2023-03-02 NOTE — TELEPHONE ENCOUNTER
Received refill request for atorvastatin 20 mg po tabs. No labs on file in >1 year. Please check CMP & lipid panel, or obtain records if done elsewhere in the past year. Refill request authorized.     Future Appointments   Date Time Provider Allegra Lakisha   11/16/2023  8:20 AM MD [facility]avez [facility]

## 2023-09-11 RX ORDER — ATORVASTATIN CALCIUM 20 MG/1
TABLET, FILM COATED ORAL
Qty: 90 TABLET | Refills: 1 | Status: SHIPPED | OUTPATIENT
Start: 2023-09-11

## 2023-09-11 RX ORDER — DILTIAZEM HYDROCHLORIDE 120 MG/1
120 CAPSULE, COATED, EXTENDED RELEASE ORAL DAILY
Qty: 90 CAPSULE | Refills: 1 | Status: SHIPPED | OUTPATIENT
Start: 2023-09-11

## 2024-02-07 NOTE — TELEPHONE ENCOUNTER
Requested Prescriptions     Pending Prescriptions Disp Refills    dilTIAZem (CARDIZEM CD) 120 MG extended release capsule [Pharmacy Med Name: diltiazem  mg capsule,extended release 24 hr] 90 capsule 1     Sig: TAKE ONE CAPSULE BY MOUTH EVERY DAY    atorvastatin (LIPITOR) 20 MG tablet [Pharmacy Med Name: atorvastatin 20 mg tablet] 90 tablet 1     Sig: TAKE ONE TABLET EVERY DAY       Med refilled per VO by MD.    Future Appointments   Date Time Provider Department Center   4/19/2024  8:40 AM Sandra Whyte, APRN - [facility]

## 2024-02-08 RX ORDER — ATORVASTATIN CALCIUM 20 MG/1
TABLET, FILM COATED ORAL
Qty: 90 TABLET | Refills: 0 | Status: SHIPPED | OUTPATIENT
Start: 2024-02-08

## 2024-02-08 RX ORDER — DILTIAZEM HYDROCHLORIDE 120 MG/1
120 CAPSULE, COATED, EXTENDED RELEASE ORAL DAILY
Qty: 90 CAPSULE | Refills: 0 | Status: SHIPPED | OUTPATIENT
Start: 2024-02-08

## 2024-02-08 NOTE — TELEPHONE ENCOUNTER
VO per ROBERT Flores    Future Appointments   Date Time Provider Department Center   4/19/2024  8:40 AM Sandra Whyte, APRN - [facility]

## 2024-02-10 NOTE — TELEPHONE ENCOUNTER
[facility] called to follow up on a refill request for atorvastatin 20mg        [facility]   [phone] no